# Patient Record
Sex: MALE | Race: WHITE | NOT HISPANIC OR LATINO | Employment: UNEMPLOYED | URBAN - METROPOLITAN AREA
[De-identification: names, ages, dates, MRNs, and addresses within clinical notes are randomized per-mention and may not be internally consistent; named-entity substitution may affect disease eponyms.]

---

## 2020-01-31 ENCOUNTER — HOSPITAL ENCOUNTER (EMERGENCY)
Facility: HOSPITAL | Age: 55
Discharge: HOME | End: 2020-01-31
Attending: EMERGENCY MEDICINE
Payer: MEDICAID

## 2020-01-31 VITALS
HEART RATE: 86 BPM | TEMPERATURE: 98 F | SYSTOLIC BLOOD PRESSURE: 130 MMHG | BODY MASS INDEX: 32.18 KG/M2 | HEIGHT: 67 IN | RESPIRATION RATE: 20 BRPM | DIASTOLIC BLOOD PRESSURE: 99 MMHG | WEIGHT: 205 LBS | OXYGEN SATURATION: 99 %

## 2020-01-31 DIAGNOSIS — S05.01XA ABRASION OF RIGHT CORNEA, INITIAL ENCOUNTER: Primary | ICD-10-CM

## 2020-01-31 PROCEDURE — 63600175 PHARM REV CODE 636 W HCPCS: Performed by: PHYSICIAN ASSISTANT

## 2020-01-31 PROCEDURE — 90715 TDAP VACCINE 7 YRS/> IM: CPT | Performed by: PHYSICIAN ASSISTANT

## 2020-01-31 PROCEDURE — 99284 EMERGENCY DEPT VISIT MOD MDM: CPT | Mod: 25

## 2020-01-31 PROCEDURE — 25000003 PHARM REV CODE 250: Performed by: PHYSICIAN ASSISTANT

## 2020-01-31 PROCEDURE — 90471 IMMUNIZATION ADMIN: CPT | Performed by: PHYSICIAN ASSISTANT

## 2020-01-31 RX ORDER — ERYTHROMYCIN 5 MG/G
OINTMENT OPHTHALMIC
Qty: 1 TUBE | Refills: 0 | Status: SHIPPED | OUTPATIENT
Start: 2020-01-31

## 2020-01-31 RX ORDER — PROPARACAINE HYDROCHLORIDE 5 MG/ML
1 SOLUTION/ DROPS OPHTHALMIC
Status: COMPLETED | OUTPATIENT
Start: 2020-01-31 | End: 2020-01-31

## 2020-01-31 RX ORDER — HYDROCODONE BITARTRATE AND ACETAMINOPHEN 5; 325 MG/1; MG/1
1 TABLET ORAL EVERY 4 HOURS PRN
Qty: 6 TABLET | Refills: 0 | Status: SHIPPED | OUTPATIENT
Start: 2020-01-31

## 2020-01-31 RX ADMIN — FLUORESCEIN SODIUM 1 EACH: 1 STRIP OPHTHALMIC at 06:01

## 2020-01-31 RX ADMIN — CLOSTRIDIUM TETANI TOXOID ANTIGEN (FORMALDEHYDE INACTIVATED), CORYNEBACTERIUM DIPHTHERIAE TOXOID ANTIGEN (FORMALDEHYDE INACTIVATED), BORDETELLA PERTUSSIS TOXOID ANTIGEN (GLUTARALDEHYDE INACTIVATED), BORDETELLA PERTUSSIS FILAMENTOUS HEMAGGLUTININ ANTIGEN (FORMALDEHYDE INACTIVATED), BORDETELLA PERTUSSIS PERTACTIN ANTIGEN, AND BORDETELLA PERTUSSIS FIMBRIAE 2/3 ANTIGEN 0.5 ML: 5; 2; 2.5; 5; 3; 5 INJECTION, SUSPENSION INTRAMUSCULAR at 06:01

## 2020-01-31 RX ADMIN — PROPARACAINE HYDROCHLORIDE 1 DROP: 5 SOLUTION/ DROPS OPHTHALMIC at 06:01
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
## 2020-02-01 NOTE — DISCHARGE INSTRUCTIONS
Please follow-up with optometrist or Ophthalmologist as soon as possible to ensure proper wound healing

## 2023-04-03 ENCOUNTER — HOSPITAL ENCOUNTER (EMERGENCY)
Facility: HOSPITAL | Age: 58
Discharge: HOME | End: 2023-04-03
Attending: EMERGENCY MEDICINE
Payer: MEDICAID

## 2023-04-03 VITALS
OXYGEN SATURATION: 99 % | TEMPERATURE: 99 F | HEART RATE: 95 BPM | WEIGHT: 191 LBS | RESPIRATION RATE: 18 BRPM | SYSTOLIC BLOOD PRESSURE: 130 MMHG | BODY MASS INDEX: 28.95 KG/M2 | DIASTOLIC BLOOD PRESSURE: 75 MMHG | HEIGHT: 68 IN

## 2023-04-03 DIAGNOSIS — R50.9 FEVER: ICD-10-CM

## 2023-04-03 DIAGNOSIS — J18.9 PNEUMONIA OF RIGHT LOWER LOBE DUE TO INFECTIOUS ORGANISM: Primary | ICD-10-CM

## 2023-04-03 DIAGNOSIS — R31.9 HEMATURIA, UNSPECIFIED TYPE: ICD-10-CM

## 2023-04-03 DIAGNOSIS — R09.89 RALES: ICD-10-CM

## 2023-04-03 DIAGNOSIS — E87.1 HYPONATREMIA: ICD-10-CM

## 2023-04-03 DIAGNOSIS — R73.9 HYPERGLYCEMIA: ICD-10-CM

## 2023-04-03 LAB
ALBUMIN SERPL BCP-MCNC: 2.9 G/DL (ref 3.5–5.2)
ALP SERPL-CCNC: 72 U/L (ref 55–135)
ALT SERPL W/O P-5'-P-CCNC: 31 U/L (ref 10–44)
ANION GAP SERPL CALC-SCNC: 6 MMOL/L (ref 8–16)
AST SERPL-CCNC: 35 U/L (ref 10–40)
BACTERIA #/AREA URNS HPF: ABNORMAL /HPF
BASOPHILS # BLD AUTO: 0.03 K/UL (ref 0–0.2)
BASOPHILS NFR BLD: 0.4 % (ref 0–1.9)
BILIRUB SERPL-MCNC: 1.3 MG/DL (ref 0.1–1)
BILIRUB UR QL STRIP: NEGATIVE
BUN SERPL-MCNC: 15 MG/DL (ref 6–20)
CALCIUM SERPL-MCNC: 8.8 MG/DL (ref 8.7–10.5)
CHLORIDE SERPL-SCNC: 95 MMOL/L (ref 95–110)
CLARITY UR: CLEAR
CO2 SERPL-SCNC: 28 MMOL/L (ref 23–29)
COLOR UR: YELLOW
CREAT SERPL-MCNC: 0.9 MG/DL (ref 0.5–1.4)
DIFFERENTIAL METHOD: ABNORMAL
EOSINOPHIL # BLD AUTO: 0 K/UL (ref 0–0.5)
EOSINOPHIL NFR BLD: 0 % (ref 0–8)
ERYTHROCYTE [DISTWIDTH] IN BLOOD BY AUTOMATED COUNT: 14 % (ref 11.5–14.5)
EST. GFR  (NO RACE VARIABLE): >60 ML/MIN/1.73 M^2
GLUCOSE SERPL-MCNC: 171 MG/DL (ref 70–110)
GLUCOSE UR QL STRIP: ABNORMAL
HCT VFR BLD AUTO: 38.8 % (ref 40–54)
HGB BLD-MCNC: 13.3 G/DL (ref 14–18)
HGB UR QL STRIP: ABNORMAL
HYALINE CASTS #/AREA URNS LPF: 0 /LPF
IMM GRANULOCYTES # BLD AUTO: 0.03 K/UL (ref 0–0.04)
IMM GRANULOCYTES NFR BLD AUTO: 0.4 % (ref 0–0.5)
KETONES UR QL STRIP: ABNORMAL
LEUKOCYTE ESTERASE UR QL STRIP: NEGATIVE
LIPASE SERPL-CCNC: 14 U/L (ref 4–60)
LYMPHOCYTES # BLD AUTO: 0.3 K/UL (ref 1–4.8)
LYMPHOCYTES NFR BLD: 4.2 % (ref 18–48)
MCH RBC QN AUTO: 29.7 PG (ref 27–31)
MCHC RBC AUTO-ENTMCNC: 34.3 G/DL (ref 32–36)
MCV RBC AUTO: 87 FL (ref 82–98)
MICROSCOPIC COMMENT: ABNORMAL
MONOCYTES # BLD AUTO: 0.6 K/UL (ref 0.3–1)
MONOCYTES NFR BLD: 8.2 % (ref 4–15)
NEUTROPHILS # BLD AUTO: 5.8 K/UL (ref 1.8–7.7)
NEUTROPHILS NFR BLD: 86.8 % (ref 38–73)
NITRITE UR QL STRIP: NEGATIVE
NRBC BLD-RTO: 0 /100 WBC
PH UR STRIP: 6 [PH] (ref 5–8)
PLATELET # BLD AUTO: 107 K/UL (ref 150–450)
PMV BLD AUTO: 11.7 FL (ref 9.2–12.9)
POTASSIUM SERPL-SCNC: 4.1 MMOL/L (ref 3.5–5.1)
PROT SERPL-MCNC: 7 G/DL (ref 6–8.4)
PROT UR QL STRIP: ABNORMAL
RBC # BLD AUTO: 4.48 M/UL (ref 4.6–6.2)
RBC #/AREA URNS HPF: 17 /HPF (ref 0–4)
SODIUM SERPL-SCNC: 129 MMOL/L (ref 136–145)
SP GR UR STRIP: 1.02 (ref 1–1.03)
URN SPEC COLLECT METH UR: ABNORMAL
UROBILINOGEN UR STRIP-ACNC: ABNORMAL EU/DL
WBC # BLD AUTO: 6.67 K/UL (ref 3.9–12.7)
WBC #/AREA URNS HPF: 1 /HPF (ref 0–5)

## 2023-04-03 PROCEDURE — 81000 URINALYSIS NONAUTO W/SCOPE: CPT | Performed by: PHYSICIAN ASSISTANT

## 2023-04-03 PROCEDURE — 85025 COMPLETE CBC W/AUTO DIFF WBC: CPT | Performed by: EMERGENCY MEDICINE

## 2023-04-03 PROCEDURE — 63600175 PHARM REV CODE 636 W HCPCS: Performed by: PHYSICIAN ASSISTANT

## 2023-04-03 PROCEDURE — 63600175 PHARM REV CODE 636 W HCPCS: Performed by: EMERGENCY MEDICINE

## 2023-04-03 PROCEDURE — 96375 TX/PRO/DX INJ NEW DRUG ADDON: CPT

## 2023-04-03 PROCEDURE — 25000003 PHARM REV CODE 250: Performed by: EMERGENCY MEDICINE

## 2023-04-03 PROCEDURE — 96374 THER/PROPH/DIAG INJ IV PUSH: CPT

## 2023-04-03 PROCEDURE — 80053 COMPREHEN METABOLIC PANEL: CPT | Performed by: EMERGENCY MEDICINE

## 2023-04-03 PROCEDURE — 25000003 PHARM REV CODE 250: Performed by: PHYSICIAN ASSISTANT

## 2023-04-03 PROCEDURE — 83690 ASSAY OF LIPASE: CPT | Performed by: EMERGENCY MEDICINE

## 2023-04-03 PROCEDURE — 96361 HYDRATE IV INFUSION ADD-ON: CPT

## 2023-04-03 PROCEDURE — 99285 EMERGENCY DEPT VISIT HI MDM: CPT | Mod: 25

## 2023-04-03 RX ORDER — KETOROLAC TROMETHAMINE 30 MG/ML
15 INJECTION, SOLUTION INTRAMUSCULAR; INTRAVENOUS
Status: COMPLETED | OUTPATIENT
Start: 2023-04-03 | End: 2023-04-03

## 2023-04-03 RX ORDER — LEVOFLOXACIN 750 MG/1
750 TABLET ORAL
Status: COMPLETED | OUTPATIENT
Start: 2023-04-03 | End: 2023-04-03

## 2023-04-03 RX ORDER — ONDANSETRON 2 MG/ML
4 INJECTION INTRAMUSCULAR; INTRAVENOUS
Status: COMPLETED | OUTPATIENT
Start: 2023-04-03 | End: 2023-04-03

## 2023-04-03 RX ORDER — LEVOFLOXACIN 750 MG/1
750 TABLET ORAL DAILY
Qty: 4 TABLET | Refills: 0 | Status: SHIPPED | OUTPATIENT
Start: 2023-04-04 | End: 2023-04-08

## 2023-04-03 RX ORDER — MAG HYDROX/ALUMINUM HYD/SIMETH 200-200-20
30 SUSPENSION, ORAL (FINAL DOSE FORM) ORAL
Status: DISCONTINUED | OUTPATIENT
Start: 2023-04-03 | End: 2023-04-03 | Stop reason: HOSPADM

## 2023-04-03 RX ORDER — FAMOTIDINE 20 MG/1
20 TABLET, FILM COATED ORAL
Status: COMPLETED | OUTPATIENT
Start: 2023-04-03 | End: 2023-04-03

## 2023-04-03 RX ORDER — ALUMINUM HYDROXIDE, MAGNESIUM HYDROXIDE, AND SIMETHICONE 2400; 240; 2400 MG/30ML; MG/30ML; MG/30ML
15 SUSPENSION ORAL EVERY 6 HOURS PRN
Refills: 0 | COMMUNITY
Start: 2023-04-03 | End: 2023-04-03 | Stop reason: SDUPTHER

## 2023-04-03 RX ORDER — LEVOFLOXACIN 750 MG/1
750 TABLET ORAL DAILY
Qty: 4 TABLET | Refills: 0 | Status: SHIPPED | OUTPATIENT
Start: 2023-04-04 | End: 2023-04-03 | Stop reason: SDUPTHER

## 2023-04-03 RX ORDER — FAMOTIDINE 20 MG/1
20 TABLET, FILM COATED ORAL 2 TIMES DAILY
Qty: 20 TABLET | Refills: 0 | Status: SHIPPED | OUTPATIENT
Start: 2023-04-03 | End: 2024-04-02

## 2023-04-03 RX ORDER — ONDANSETRON 4 MG/1
4 TABLET, ORALLY DISINTEGRATING ORAL EVERY 8 HOURS PRN
Qty: 12 TABLET | Refills: 0 | Status: SHIPPED | OUTPATIENT
Start: 2023-04-03

## 2023-04-03 RX ORDER — ONDANSETRON 4 MG/1
4 TABLET, ORALLY DISINTEGRATING ORAL EVERY 8 HOURS PRN
Qty: 12 TABLET | Refills: 0 | Status: SHIPPED | OUTPATIENT
Start: 2023-04-03 | End: 2023-04-03 | Stop reason: SDUPTHER

## 2023-04-03 RX ORDER — ALUMINUM HYDROXIDE, MAGNESIUM HYDROXIDE, AND SIMETHICONE 2400; 240; 2400 MG/30ML; MG/30ML; MG/30ML
15 SUSPENSION ORAL EVERY 6 HOURS PRN
Refills: 0 | COMMUNITY
Start: 2023-04-03 | End: 2024-04-02

## 2023-04-03 RX ORDER — FAMOTIDINE 20 MG/1
20 TABLET, FILM COATED ORAL 2 TIMES DAILY
Qty: 20 TABLET | Refills: 0 | Status: SHIPPED | OUTPATIENT
Start: 2023-04-03 | End: 2023-04-03 | Stop reason: SDUPTHER

## 2023-04-03 RX ADMIN — LEVOFLOXACIN 750 MG: 750 TABLET, FILM COATED ORAL at 01:04

## 2023-04-03 RX ADMIN — FAMOTIDINE 20 MG: 20 TABLET, FILM COATED ORAL at 01:04

## 2023-04-03 RX ADMIN — SODIUM CHLORIDE 1000 ML: 0.9 INJECTION, SOLUTION INTRAVENOUS at 11:04

## 2023-04-03 RX ADMIN — KETOROLAC TROMETHAMINE 15 MG: 30 INJECTION, SOLUTION INTRAMUSCULAR; INTRAVENOUS at 12:04

## 2023-04-03 RX ADMIN — ONDANSETRON HYDROCHLORIDE 4 MG: 2 SOLUTION INTRAMUSCULAR; INTRAVENOUS at 11:04

## 2023-04-03 NOTE — ED PROVIDER NOTES
Encounter Date: 4/3/2023       History     Chief Complaint   Patient presents with    Headache     Pt reports headache x 5 days. Pt also has fever, (he is visibly diaphoretic), nausea and abdominal pain. + vomiting as well. Pt reports fever at home was 103 but he took motrin pta. Pt has  appetite but trying to drink fluids. Pt appears in mild distress in triage.      HPI  This is a 57 y.o. male who has a past medical history of Chronic kidney disease, Diabetes mellitus, type 2, and Hypertension.     The patient presents to the Emergency Department with headache x5 days, associated with fever yesterday and today, 103 this morning.  Associated occasional congestion and cough, decreased appetite, nausea and a few episodes of vomiting.  Denies any diarrhea state that he has some upper abdominal pain as well.  No neck stiffness. States difficulty with urination but no pain. Urine is darker today.       Review of patient's allergies indicates:  No Known Allergies  Past Medical History:   Diagnosis Date    Chronic kidney disease     Diabetes mellitus, type 2     Hypertension      No past surgical history on file.  Family History   Problem Relation Age of Onset    Heart attack Mother     Hypertension Mother     Heart attack Father     Hypertension Father     Diabetes Sister     No Known Problems Brother     No Known Problems Maternal Aunt     No Known Problems Maternal Uncle     No Known Problems Paternal Aunt     No Known Problems Paternal Uncle     No Known Problems Maternal Grandmother     No Known Problems Maternal Grandfather     No Known Problems Paternal Grandmother     No Known Problems Paternal Grandfather     Amblyopia Neg Hx     Blindness Neg Hx     Cancer Neg Hx     Cataracts Neg Hx     Glaucoma Neg Hx     Macular degeneration Neg Hx     Retinal detachment Neg Hx     Strabismus Neg Hx     Stroke Neg Hx     Thyroid disease Neg Hx      Social History     Tobacco Use    Smoking status: Never    Smokeless  tobacco: Never   Substance Use Topics    Alcohol use: No    Drug use: No     Review of Systems   Constitutional:  Positive for activity change, appetite change, diaphoresis and fever.   HENT:  Positive for congestion.    Respiratory:  Positive for cough. Negative for shortness of breath.    Gastrointestinal:  Positive for abdominal pain, nausea and vomiting. Negative for diarrhea.   Neurological:  Positive for dizziness (Meniere's dz). Negative for weakness.   All other systems reviewed and are negative.    Physical Exam     Initial Vitals [04/03/23 1043]   BP Pulse Resp Temp SpO2   133/78 96 18 99 °F (37.2 °C) 96 %      MAP       --         Physical Exam    Nursing note and vitals reviewed.  Constitutional: He appears well-developed and well-nourished. He is diaphoretic (mild). No distress.   HENT:   Head: Normocephalic and atraumatic.   Right Ear: External ear normal.   Left Ear: External ear normal.   Mouth/Throat: Oropharynx is clear and moist. No oropharyngeal exudate.   TM WNL bilat  Nose WNL  Post pharynx WNL, unremarkable   Eyes: Conjunctivae are normal. Pupils are equal, round, and reactive to light.   Neck: Neck supple.   Normal range of motion.  Cardiovascular:  Normal rate, regular rhythm, normal heart sounds and intact distal pulses.           Pulmonary/Chest: No respiratory distress. He has no wheezes. He has rales (right lower lung field posteriorly).   Abdominal: Abdomen is soft. Bowel sounds are normal. He exhibits no distension. There is abdominal tenderness (epgastric). There is no rebound and no guarding.   Musculoskeletal:         General: No tenderness or edema. Normal range of motion.      Cervical back: Normal range of motion and neck supple.     Lymphadenopathy:     He has no cervical adenopathy.   Neurological: He is alert and oriented to person, place, and time. He has normal strength.   Skin: Skin is warm. Capillary refill takes less than 2 seconds. No rash noted. No erythema.    Psychiatric: He has a normal mood and affect. Thought content normal.       ED Course   Procedures  Labs Reviewed   URINALYSIS, REFLEX TO URINE CULTURE - Abnormal; Notable for the following components:       Result Value    Protein, UA 2+ (*)     Glucose, UA Trace (*)     Ketones, UA 1+ (*)     Occult Blood UA 2+ (*)     Urobilinogen, UA 2.0-3.0 (*)     All other components within normal limits    Narrative:     Specimen Source->Urine   URINALYSIS MICROSCOPIC - Abnormal; Notable for the following components:    RBC, UA 17 (*)     All other components within normal limits    Narrative:     Specimen Source->Urine   COMPREHENSIVE METABOLIC PANEL - Abnormal; Notable for the following components:    Sodium 129 (*)     Glucose 171 (*)     Albumin 2.9 (*)     Total Bilirubin 1.3 (*)     Anion Gap 6 (*)     All other components within normal limits    Narrative:     Collection has been rescheduled by [facility] at 04/03/2023 12:40 Reason:   Patient unavailable also in Ct   CBC W/ AUTO DIFFERENTIAL - Abnormal; Notable for the following components:    RBC 4.48 (*)     Hemoglobin 13.3 (*)     Hematocrit 38.8 (*)     Platelets 107 (*)     Lymph # 0.3 (*)     Gran % 86.8 (*)     Lymph % 4.2 (*)     All other components within normal limits    Narrative:     Collection has been rescheduled by [facility] at 04/03/2023 12:40 Reason:   Patient unavailable also in Ct   LIPASE    Narrative:     Collection has been rescheduled by [facility] at 04/03/2023 12:40 Reason:   Patient unavailable also in Ct          Imaging Results               CT Abdomen Pelvis  Without Contrast (Final result)  Result time 04/03/23 15:08:43      Final result by Kendrick Alfonso MD (04/03/23 15:08:43)                   Impression:      This report was flagged in Epic as abnormal.    1. Right perinephric inflammation without obstruction.  Finding may reflect sequela of recently passed calculus or infection, correlation with urinalysis is advised in this patient with  history of hematuria.  2. Multiple patchy foci of consolidation, much of which in a tree-in-bud type configuration, throughout the right lower lobe concerning for infection.  Follow-up is recommended to ensure resolution and to exclude malignancy.  3. Findings suggest hepatic steatosis, correlation with LFTs recommended.  4. Please see above for several additional findings.      Electronically signed by: Kendrick Alfonso MD  Date:    04/03/2023  Time:    15:08               Narrative:    EXAMINATION:  CT ABDOMEN PELVIS WITHOUT CONTRAST    CLINICAL HISTORY:  Hematuria, gross/macroscopic;    TECHNIQUE:  Low dose axial images, sagittal and coronal reformations were obtained from the lung bases to the pubic symphysis.  Oral contrast was not administered.    COMPARISON:  None    FINDINGS:  Images of the lower thorax are remarkable for patchy consolidation, much of which in a tree-in-bud type configuration, within the right lower lobe.    The liver is hypoattenuating, may reflect steatosis, correlation with LFTs recommended.  The spleen and adrenal glands are grossly unremarkable.  The gallbladder is unremarkable.  There is fatty atrophy of the pancreas without pancreatic ductal dilation.  The stomach is decompressed without wall thickening.  There are a few scattered prominent kassie hepatic lymph nodes.    The left kidney has a grossly unremarkable noncontrast appearance without hydronephrosis or nephrolithiasis.  The left ureter is unremarkable without calculi seen.  There is right perinephric fat stranding.  No right nephrolithiasis.  No right hydronephrosis.  The right ureter is unremarkable without calculi seen.  The urinary bladder is distended without wall thickening.  The prostate is not enlarged.    There are several scattered colonic diverticula without inflammation.  The terminal ileum and appendix are unremarkable.  The small bowel is grossly unremarkable.  There are several scattered shotty periaortic,  pericaval, and mesenteric lymph nodes.  There is atherosclerotic calcification of the aorta.  No focal organized pelvic fluid collection.  There is a fat containing left inguinal hernia.    There are degenerative changes of the spine.  No significant inguinal lymphadenopathy.                                       X-Ray Chest PA And Lateral (Final result)  Result time 04/03/23 12:52:20      Final result by Osmani Zuñiga III, MD (04/03/23 12:52:20)                   Impression:      Right lower lobe pneumonia.      Electronically signed by: Osmani Zuñiga MD  Date:    04/03/2023  Time:    12:52               Narrative:    EXAMINATION:  XR CHEST PA AND LATERAL    CLINICAL HISTORY:  Fever, unspecified    FINDINGS:  There is a right lower lobe posterior basal segment infiltrate concerning for pneumonia.  This is new compared to 07/02/2012.  Left lung is clear the heart size is normal.  The bones showed DJD.                                    X-Rays:   Independently Interpreted Readings:   Chest X-Ray: There is an infiltrate in the RLL. Pneumonia   Medications   sodium chloride 0.9% bolus 1,000 mL 1,000 mL (0 mLs Intravenous Stopped 4/3/23 1253)   ondansetron injection 4 mg (4 mg Intravenous Given 4/3/23 1156)   ketorolac injection 15 mg (15 mg Intravenous Given 4/3/23 1222)   levoFLOXacin tablet 750 mg (750 mg Oral Given 4/3/23 1354)   famotidine tablet 20 mg (20 mg Oral Given 4/3/23 1355)     Medical Decision Making:   Initial Assessment:   This is an emergent evaluation of a 57 y.o.male patient with presentation of fever, headache x5 days, is complaints such as mild congestion, intermittent cough, upper abdominal pain in the epigastric region with nausea, worsened by p.o. intake.     Initial differentials include but are not limited to:  COVID, flu, other viral syndrome, community-acquired pneumonia, gastritis, ulcer, pancreatitis, cholelithiasis or cholecystitis, prostatitis, UTI.     Plan:  Agree with tele-triage  provider. Will add on chest x-ray and toradol 15mg IV.     Clinical Tests:   Lab Tests: Ordered and Reviewed  Radiological Study: Ordered and Reviewed                 Patient has evidence of pneumonia on x-ray.  Given Levaquin in the ED.     Patient's upper abdominal pain improved after Pepcid and Maalox, likely gastritis, possible ulcer.  Will need further follow-up with PCP and possible referral to GI.    CT abd/pelvis shows right perinephric inflammatory change, possible recent stone but no evidence currently.  As well there is no evidence of UTI, so doubt pyelonephritis. Discussed findings with regard to hematuria and possible differential diagnoses including cancer.  Patient may need further evaluation by Urology and repeat urinalysis.    Patient and spouse at the bedside expressed understanding to the findings, discussed return precautions and ability to return to the ED if they feel necessary.       Clinical Impression:   Final diagnoses:  [R09.89] Rales  [R50.9] Fever  [J18.9] Pneumonia of right lower lobe due to infectious organism (Primary)  [R31.9] Hematuria, unspecified type  [E87.1] Hyponatremia  [R73.9] Hyperglycemia        ED Disposition Condition    Discharge Stable          ED Prescriptions       Medication Sig Dispense Start Date End Date Auth. Provider    famotidine (PEPCID) 20 MG tablet  (Status: Discontinued) Take 1 tablet (20 mg total) by mouth 2 (two) times daily. 20 tablet 4/3/2023 4/3/2023 Te Sotomayor MD    levoFLOXacin (LEVAQUIN) 750 MG tablet  (Status: Discontinued) Take 1 tablet (750 mg total) by mouth once daily. for 4 days 4 tablet 4/4/2023 4/3/2023 Te Sotomayor MD    aluminum & magnesium hydroxide-simethicone (MAALOX MAXIMUM STRENGTH) 400-400-40 mg/5 mL suspension  (Status: Discontinued) Take 15 mLs by mouth every 6 (six) hours as needed for Indigestion. -- 4/3/2023 4/3/2023 Te Sotomayor MD    ondansetron (ZOFRAN-ODT) 4 MG TbDL  (Status: Discontinued) Take 1 tablet (4 mg total)  by mouth every 8 (eight) hours as needed (nausea/vomiting). 12 tablet 4/3/2023 4/3/2023 Te Sotomayor MD    aluminum & magnesium hydroxide-simethicone (MAALOX MAXIMUM STRENGTH) 400-400-40 mg/5 mL suspension Take 15 mLs by mouth every 6 (six) hours as needed for Indigestion. -- 4/3/2023 4/2/2024 Te Sotomayor MD    famotidine (PEPCID) 20 MG tablet Take 1 tablet (20 mg total) by mouth 2 (two) times daily. 20 tablet 4/3/2023 4/2/2024 Te Sotomayor MD    levoFLOXacin (LEVAQUIN) 750 MG tablet Take 1 tablet (750 mg total) by mouth once daily. for 4 days 4 tablet 4/4/2023 4/8/2023 Te Sotomayor MD    ondansetron (ZOFRAN-ODT) 4 MG TbDL Take 1 tablet (4 mg total) by mouth every 8 (eight) hours as needed (nausea/vomiting). 12 tablet 4/3/2023 -- Te Sotomayor MD          Follow-up Information       Follow up With Specialties Details Why Contact Info    Ramsey Tran MD Family Medicine, Sports Medicine Schedule an appointment as soon as possible for a visit   [address]  [place] [address]  [phone]               Te Sotomayor MD  04/04/23 4696

## 2023-04-03 NOTE — FIRST PROVIDER EVALUATION
Emergency Department TeleTriage Encounter Note      CHIEF COMPLAINT    Chief Complaint   Patient presents with    Headache     Pt reports headache x 5 days. Pt also has fever, (he is visibly diaphoretic), nausea and abdominal pain. + vomiting as well. Pt reports fever at home was 103 but he took motrin pta. Pt has  appetite but trying to drink fluids. Pt appears in mild distress in triage.        VITAL SIGNS   Initial Vitals [23 1043]   BP Pulse Resp Temp SpO2   133/78 96 18 99 °F (37.2 °C) 96 %      MAP       --            ALLERGIES    Review of patient's allergies indicates:  No Known Allergies    PROVIDER TRIAGE NOTE  Patient presents with fever, headache, central lower abdominal pain and nausea/vomiting. No recent travel or known exposure to illness.       ORDERS  Labs Reviewed - No data to display    ED Orders (720h ago, onward)      None              Virtual Visit Note: The provider triage portion of this emergency department evaluation and documentation was performed via [employer], a HIPAA-compliant telemedicine application, in concert with a tele-presenter in the room. A face to face patient evaluation with one of my colleagues will occur once the patient is placed in an emergency department room.      DISCLAIMER: This note was prepared with [employer] voice recognition transcription software. Garbled syntax, mangled pronouns, and other bizarre constructions may be attributed to that software system.

## 2023-04-03 NOTE — DISCHARGE INSTRUCTIONS
Thank you for coming in to see us at [facility]! It was nice to meet you, and I hope you feel better soon. Please feel free to return to the ER at any time should your symptoms get worse, or if you have different emergent concerns.    Our goal in the emergency department is to always give you outstanding care and exceptional service. You may receive a survey by mail or e-mail in the next week regarding your experience in our ED. We would greatly appreciate your completing and returning the survey. Your feedback provides us with a way to recognize our staff who give very good care and it helps us learn how to improve when your experience was below our aspiration of excellence.       Sincerely,    Te Sotomayor MD  Medical Director  Emergency Department  [facility]

## 2023-04-03 NOTE — ED TRIAGE NOTES
Pt is reporting a headache, nv, fever, fatigue, and gen abd pain x5 days. Pt treated his nausea and fever at home pta. Denies diarrhea and sob   light meconium

## 2023-05-08 ENCOUNTER — HOSPITAL ENCOUNTER (OUTPATIENT)
Dept: RADIOLOGY | Facility: HOSPITAL | Age: 58
Discharge: HOME | End: 2023-05-08
Attending: FAMILY MEDICINE
Payer: MEDICAID

## 2023-05-08 DIAGNOSIS — J18.9 PNEUMONIA: ICD-10-CM

## 2023-05-08 DIAGNOSIS — J18.9 PNEUMONIA: Primary | ICD-10-CM

## 2023-05-08 PROCEDURE — 71046 X-RAY EXAM CHEST 2 VIEWS: CPT | Mod: TC

## 2023-05-08 PROCEDURE — 71046 XR CHEST PA AND LATERAL: ICD-10-PCS | Mod: 26,,, | Performed by: RADIOLOGY

## 2023-05-08 PROCEDURE — 71046 X-RAY EXAM CHEST 2 VIEWS: CPT | Mod: 26,,, | Performed by: RADIOLOGY

## 2024-02-03 ENCOUNTER — HOSPITAL ENCOUNTER (EMERGENCY)
Facility: HOSPITAL | Age: 59
Discharge: HOME | End: 2024-02-03
Attending: EMERGENCY MEDICINE
Payer: MEDICAID

## 2024-02-03 VITALS
WEIGHT: 200 LBS | TEMPERATURE: 98 F | DIASTOLIC BLOOD PRESSURE: 80 MMHG | OXYGEN SATURATION: 97 % | RESPIRATION RATE: 13 BRPM | BODY MASS INDEX: 30.41 KG/M2 | HEART RATE: 71 BPM | SYSTOLIC BLOOD PRESSURE: 143 MMHG

## 2024-02-03 DIAGNOSIS — K92.2 GASTROINTESTINAL HEMORRHAGE, UNSPECIFIED GASTROINTESTINAL HEMORRHAGE TYPE: Primary | ICD-10-CM

## 2024-02-03 LAB
ALBUMIN SERPL BCP-MCNC: 3.9 G/DL (ref 3.5–5.2)
ALP SERPL-CCNC: 50 U/L (ref 55–135)
ALT SERPL W/O P-5'-P-CCNC: 28 U/L (ref 10–44)
ANION GAP SERPL CALC-SCNC: 10 MMOL/L (ref 8–16)
AST SERPL-CCNC: 20 U/L (ref 10–40)
BASOPHILS # BLD AUTO: 0.05 K/UL (ref 0–0.2)
BASOPHILS NFR BLD: 1 % (ref 0–1.9)
BILIRUB SERPL-MCNC: 1 MG/DL (ref 0.1–1)
BUN SERPL-MCNC: 17 MG/DL (ref 6–20)
CALCIUM SERPL-MCNC: 9.3 MG/DL (ref 8.7–10.5)
CHLORIDE SERPL-SCNC: 103 MMOL/L (ref 95–110)
CO2 SERPL-SCNC: 26 MMOL/L (ref 23–29)
CREAT SERPL-MCNC: 0.9 MG/DL (ref 0.5–1.4)
DIFFERENTIAL METHOD BLD: ABNORMAL
EOSINOPHIL # BLD AUTO: 0.1 K/UL (ref 0–0.5)
EOSINOPHIL NFR BLD: 2.3 % (ref 0–8)
ERYTHROCYTE [DISTWIDTH] IN BLOOD BY AUTOMATED COUNT: 13.5 % (ref 11.5–14.5)
EST. GFR  (NO RACE VARIABLE): >60 ML/MIN/1.73 M^2
GLUCOSE SERPL-MCNC: 150 MG/DL (ref 70–110)
HCT VFR BLD AUTO: 42.1 % (ref 40–54)
HGB BLD-MCNC: 14.3 G/DL (ref 14–18)
IMM GRANULOCYTES # BLD AUTO: 0.01 K/UL (ref 0–0.04)
IMM GRANULOCYTES NFR BLD AUTO: 0.2 % (ref 0–0.5)
LYMPHOCYTES # BLD AUTO: 1 K/UL (ref 1–4.8)
LYMPHOCYTES NFR BLD: 21.3 % (ref 18–48)
MCH RBC QN AUTO: 29.7 PG (ref 27–31)
MCHC RBC AUTO-ENTMCNC: 34 G/DL (ref 32–36)
MCV RBC AUTO: 88 FL (ref 82–98)
MONOCYTES # BLD AUTO: 0.5 K/UL (ref 0.3–1)
MONOCYTES NFR BLD: 9.6 % (ref 4–15)
NEUTROPHILS # BLD AUTO: 3.1 K/UL (ref 1.8–7.7)
NEUTROPHILS NFR BLD: 65.6 % (ref 38–73)
NRBC BLD-RTO: 0 /100 WBC
OB PNL STL: POSITIVE
PLATELET # BLD AUTO: 118 K/UL (ref 150–450)
PMV BLD AUTO: 12.5 FL (ref 9.2–12.9)
POTASSIUM SERPL-SCNC: 4.1 MMOL/L (ref 3.5–5.1)
PROT SERPL-MCNC: 6.8 G/DL (ref 6–8.4)
RBC # BLD AUTO: 4.81 M/UL (ref 4.6–6.2)
SODIUM SERPL-SCNC: 139 MMOL/L (ref 136–145)
WBC # BLD AUTO: 4.79 K/UL (ref 3.9–12.7)

## 2024-02-03 PROCEDURE — 85025 COMPLETE CBC W/AUTO DIFF WBC: CPT | Performed by: EMERGENCY MEDICINE

## 2024-02-03 PROCEDURE — 82272 OCCULT BLD FECES 1-3 TESTS: CPT | Performed by: EMERGENCY MEDICINE

## 2024-02-03 PROCEDURE — 99283 EMERGENCY DEPT VISIT LOW MDM: CPT

## 2024-02-03 PROCEDURE — 80053 COMPREHEN METABOLIC PANEL: CPT | Performed by: EMERGENCY MEDICINE

## 2024-02-03 NOTE — ED PROVIDER NOTES
Emergency Department Provider Note    Jerson Isabel   58 y.o. male   [id number]      2/3/2024       History     This history was obtained from the patient without limitations.      He is a 58-year-old with the below past medical history who presents by personal transportation.  He complains of daily gastrointestinal bleeding with bowel movements for approximately 15 days.  He notices blood in the commode and on the toilet paper with wiping.  He denies anal discomfort.  He has intermittent left-sided abdominal cramping.  He has intermittent lightheadedness and near-syncope, primarily with straining to have bowel movements.  He denies fever, chills, nausea, vomiting, back pain, dysuria, and hematuria.         Past Medical History:   Diagnosis Date    Chronic kidney disease     Diabetes mellitus, type 2     Hypertension       No past surgical history on file.   Family History   Problem Relation Age of Onset    Heart attack Mother     Hypertension Mother     Heart attack Father     Hypertension Father     Diabetes Sister     No Known Problems Brother     No Known Problems Maternal Aunt     No Known Problems Maternal Uncle     No Known Problems Paternal Aunt     No Known Problems Paternal Uncle     No Known Problems Maternal Grandmother     No Known Problems Maternal Grandfather     No Known Problems Paternal Grandmother     No Known Problems Paternal Grandfather     Amblyopia Neg Hx     Blindness Neg Hx     Cancer Neg Hx     Cataracts Neg Hx     Glaucoma Neg Hx     Macular degeneration Neg Hx     Retinal detachment Neg Hx     Strabismus Neg Hx     Stroke Neg Hx     Thyroid disease Neg Hx       Social History     Socioeconomic History    Marital status:    Occupational History     Employer: [employer]   Tobacco Use    Smoking status: Never    Smokeless tobacco: Never   Substance and Sexual Activity    Alcohol use: No    Drug use: No    Sexual activity: Yes     Partners: Female      Review of patient's allergies  indicates:  No Known Allergies        Physical Examination     Initial Vitals [02/03/24 1107]   BP Pulse Resp Temp SpO2   133/84 75 16 98.1 °F (36.7 °C) 99 %      MAP       --           Physical Exam    Nursing note and vitals reviewed.  Constitutional: He is not diaphoretic. No distress.   Eyes: Conjunctivae are normal. No scleral icterus.   Cardiovascular:  Normal rate, regular rhythm and normal heart sounds.     Exam reveals no gallop and no friction rub.       No murmur heard.  Pulmonary/Chest: No respiratory distress. He has no wheezes. He has no rhonchi.   Abdominal: Abdomen is soft. He exhibits no distension. There is abdominal tenderness in the left upper quadrant and left lower quadrant.   Very mild left-sided abdominal tenderness without guarding palpable masses, or rebound tenderness.   Genitourinary:    Prostate normal.   Rectum:      No rectal mass, anal fissure, tenderness, external hemorrhoid, internal hemorrhoid or abnormal anal tone.       Neurological: He is alert and oriented to person, place, and time. GCS score is 15. GCS eye subscore is 4. GCS verbal subscore is 5. GCS motor subscore is 6.   Skin: Skin is warm and dry. No pallor.            Labs     Labs Reviewed   CBC W/ AUTO DIFFERENTIAL - Abnormal; Notable for the following components:       Result Value    Platelets 118 (*)     All other components within normal limits   COMPREHENSIVE METABOLIC PANEL - Abnormal; Notable for the following components:    Glucose 150 (*)     Alkaline Phosphatase 50 (*)     All other components within normal limits   OCCULT BLOOD X 1, STOOL - Abnormal; Notable for the following components:    Occult Blood Positive (*)     All other components within normal limits        Imaging     Imaging Results    None           ED Course     The patient received the following medications:  Medications - No data to display    Procedures    ED Course as of 02/03/24 1250   Sat Feb 03, 2024   1245 Occult Blood(!): Positive [LP]    1245 WBC: 4.79 [LP]   1245 Hemoglobin: 14.3 [LP]   1245 Hematocrit: 42.1 [LP]   1245 Platelet Count(!): 118  Chronic, stable thrombocytopenia. [LP]   1245 Comprehensive metabolic panel(!)  Glucose 150. Otherwise unremarkable. [LP]      ED Course User Index  [LP] Maynor Moe III, MD        Medical Decision Making                 Medical Decision Making  Patient well-appearing with normal vital signs.  Mild left-sided abdominal tenderness.  Normal digital rectal exam.  Occult blood present.  Normal WBC count, hemoglobin, hematocrit.  Stable chronic thrombocytopenia.  No concerning electrolyte anomalies or renal insufficiency.    No indication for admission or emergent specialist consultation.  Referral to Gastroenterology submitted.  Interval follow-up with PCP advised.    Problems Addressed:  Gastrointestinal hemorrhage, unspecified gastrointestinal hemorrhage type: acute illness or injury    Amount and/or Complexity of Data Reviewed  Labs: ordered. Decision-making details documented in ED Course.         Diagnoses       ICD-10-CM ICD-9-CM   1. Gastrointestinal hemorrhage, unspecified gastrointestinal hemorrhage type  K92.2 578.9         Dispostion      ED Disposition Condition    Discharge Stable            ED Prescriptions    None         Follow-up Information       Follow up With Specialties Details Why Contact Info    Ramsey Tran MD Family Medicine, Sports Medicine  Schedule a close ER follow-up visit. [address]  [place] [address]  [phone]      ER   Return to the ER worsened bleeding, severe/constant weakness, chest pain, breathing difficulty, or any other concerns needing immediate attention.               Maynor Moe III, MD  02/03/24 [id number]

## 2024-02-03 NOTE — DISCHARGE INSTRUCTIONS
We know that you have many choices and are honored that you chose us. We hope that we met or exceeded your expectations and goals for this visit and will keep the [facility] family in mind for your future needs and those of your family and friends.     - Dr. Moe

## 2024-12-18 DIAGNOSIS — M48.9 CERVICAL SPINE DISEASE: Primary | ICD-10-CM

## 2024-12-19 ENCOUNTER — OFFICE VISIT (OUTPATIENT)
Dept: NEUROSURGERY | Facility: CLINIC | Age: 59
End: 2024-12-19
Payer: MEDICAID

## 2024-12-19 VITALS
SYSTOLIC BLOOD PRESSURE: 150 MMHG | DIASTOLIC BLOOD PRESSURE: 80 MMHG | WEIGHT: 202.69 LBS | HEART RATE: 80 BPM | BODY MASS INDEX: 30.82 KG/M2

## 2024-12-19 DIAGNOSIS — G95.9 CERVICAL MYELOPATHY WITH CERVICAL RADICULOPATHY: ICD-10-CM

## 2024-12-19 DIAGNOSIS — M54.12 CERVICAL MYELOPATHY WITH CERVICAL RADICULOPATHY: ICD-10-CM

## 2024-12-19 DIAGNOSIS — M48.02 SPINAL STENOSIS, CERVICAL REGION: Primary | ICD-10-CM

## 2024-12-19 PROCEDURE — 99212 OFFICE O/P EST SF 10 MIN: CPT | Mod: PBBFAC | Performed by: PHYSICIAN ASSISTANT

## 2024-12-19 PROCEDURE — 99999 PR PBB SHADOW E&M-EST. PATIENT-LVL II: CPT | Mod: PBBFAC,,, | Performed by: PHYSICIAN ASSISTANT

## 2024-12-19 PROCEDURE — 99205 OFFICE O/P NEW HI 60 MIN: CPT | Mod: S$PBB,,, | Performed by: PHYSICIAN ASSISTANT

## 2024-12-19 PROCEDURE — 3079F DIAST BP 80-89 MM HG: CPT | Mod: CPTII,,, | Performed by: PHYSICIAN ASSISTANT

## 2024-12-19 PROCEDURE — 4010F ACE/ARB THERAPY RXD/TAKEN: CPT | Mod: CPTII,,, | Performed by: PHYSICIAN ASSISTANT

## 2024-12-19 PROCEDURE — 3008F BODY MASS INDEX DOCD: CPT | Mod: CPTII,,, | Performed by: PHYSICIAN ASSISTANT

## 2024-12-19 PROCEDURE — 3077F SYST BP >= 140 MM HG: CPT | Mod: CPTII,,, | Performed by: PHYSICIAN ASSISTANT

## 2024-12-30 NOTE — PROGRESS NOTES
Neurosurgery  History & Physical    SUBJECTIVE:     Chief Complaint: neck pain, arm pain     History of Present Illness:  Jerson Isabel is a 59 y.o. male who presents as a referral from Dr. Luis Enrique Benjamin for second opinion for cervical fusion. Patient reports several year history of neck pain and right arm pain. Pain is in the right arm and radiates into the last two digits of the hands, recently he developed left arm pain as well. He is a  and notes weakness in his hand  over the past several months. In addition he notes some issues with balance. He denies dropping objects or bowel/bladder issues. He was seen by Dr. Brumfield with [facility] and C3-C7 ACDF was recommended. Patient chose to forego and is here for a second opinion. He is hesitant to undergo spine surgery as he has a family member that had worsening pain and issues post op. He takes aspirin 81 mg daily for preventative reasons.       Review of patient's allergies indicates:  No Known Allergies    Current Outpatient Medications   Medication Sig Dispense Refill    aspirin 81 MG Chew Take 81 mg by mouth once daily.      atorvastatin (LIPITOR) 10 MG tablet Take 1 tablet (10 mg total) by mouth once daily. 90 tablet 3    erythromycin (ROMYCIN) ophthalmic ointment Place a 1/2 inch ribbon of ointment into the lower eyelid TID for 7 days 1 Tube 0    glipiZIDE (GLUCOTROL) 10 MG tablet Take 10 mg by mouth 2 (two) times daily with meals.      GLUCOSAM/MSM/CHONDR/VIT C/HYAL (GLUCOSAMINE-CHONDROITIN-MSM ORAL) Take 1 tablet by mouth 2 (two) times daily.      HYDROcodone-acetaminophen (NORCO) 5-325 mg per tablet Take 1 tablet by mouth every 4 (four) hours as needed for Pain. 6 tablet 0    lisinopril (PRINIVIL,ZESTRIL) 20 MG tablet Take 20 mg by mouth once daily.      metformin (GLUCOPHAGE) 1000 MG tablet Take 1 tablet (1,000 mg total) by mouth 2 (two) times daily with meals. 180 tablet 3    ondansetron (ZOFRAN-ODT) 4 MG TbDL Take 1 tablet (4 mg total) by mouth  every 8 (eight) hours as needed (nausea/vomiting). 12 tablet 0    semaglutide (OZEMPIC) 0.25 mg or 0.5 mg (2 mg/3 mL) pen injector Inject 0.5 mg into the skin every 7 days. 6 mL 1    aluminum & magnesium hydroxide-simethicone (MAALOX MAXIMUM STRENGTH) 400-400-40 mg/5 mL suspension Take 15 mLs by mouth every 6 (six) hours as needed for Indigestion.  0    famotidine (PEPCID) 20 MG tablet Take 1 tablet (20 mg total) by mouth 2 (two) times daily. 20 tablet 0     No current facility-administered medications for this visit.       Past Medical History:   Diagnosis Date    Chronic kidney disease     Diabetes mellitus, type 2     Hypertension      No past surgical history on file.  Family History       Problem Relation (Age of Onset)    Diabetes Sister    Heart attack Mother, Father    Hypertension Mother, Father    No Known Problems Brother, Maternal Aunt, Maternal Uncle, Paternal Aunt, Paternal Uncle, Maternal Grandmother, Maternal Grandfather, Paternal Grandmother, Paternal Grandfather          Social History     Socioeconomic History    Marital status:    Occupational History     Employer: [employer]   Tobacco Use    Smoking status: Never    Smokeless tobacco: Never   Substance and Sexual Activity    Alcohol use: No    Drug use: No    Sexual activity: Yes     Partners: Female       Review of Systems    OBJECTIVE:     Vital Signs  Pulse: 80  BP: (!) 150/80  Pain Score: 10-Worst pain ever  Weight: 92 kg (202 lb 11.4 oz)  Body mass index is 30.82 kg/m².      Neurosurgery Physical Exam  General: well developed, well nourished, no distress.   Head: normocephalic, atraumatic  Neurologic: Alert and oriented. Thought content appropriate.  GCS: Motor: 6/Verbal: 5/Eyes: 4 GCS Total: 15  Mental Status: Awake, Alert, Oriented x 4  Language: No aphasia  Speech: No dysarthria  Cranial nerves: face symmetric, tongue midline, CN II-XII grossly intact.   Eyes: pupils equal, round, reactive to light with accommodation, EOMI.    Pulmonary: normal respirations, no signs of respiratory distress  Abdomen: soft, non-distended, not tender to palpation  Skin: Skin is warm, dry and intact.  Sensory: intact to light touch throughout    Motor Strength:Moves all extremities spontaneously with good tone.  No abnormal movements seen.     Strength  Deltoids Triceps Biceps Wrist Extension Wrist Flexion Hand    Upper: R 5/5 5/5 5/5 5/5 5/5 4/5    L 5/5 5/5 5/5 5/5 5/5 4/5     Iliopsoas Quadriceps Knee  Flexion Tibialis  anterior Gastro- cnemius EHL   Lower: R 5/5 5/5 5/5 5/5 5/5 5/5    L 5/5 5/5 5/5 5/5 5/5 5/5     Andrade's: Negative.      Diagnostic Results:  MRI C spine from 6/10/24 which I have personally reviewed shows straightening of the normal cervical lordosis. There is significant central stenosis from C3-C7, worst at C5-6. There is neural foraminal narrowing throughout, worst on the right. There appears to thickening of the longitudinal ligament.     ASSESSMENT/PLAN:     Jerson Isabel is a 59 y.o. male with symptoms of cervical myelopathy along with central canal stenosis on MRI. He has had worsening hand weakness along with worsening neck pain over the past several months. I do believe he would benefit from surgical decompression given the significant central canal stenosis along with his symptoms. I would like to get a CT of the cervical spine to rule out OPLL. I would like for him to see Dr. Long to discuss surgical options following the CT. He knows he can call the clinic in the meantime with any questions or concerns.

## 2025-01-08 ENCOUNTER — TELEPHONE (OUTPATIENT)
Dept: NEUROSURGERY | Facility: CLINIC | Age: 60
End: 2025-01-08
Payer: MEDICAID

## 2025-01-08 NOTE — TELEPHONE ENCOUNTER
"----- Message from Med Assistant Quiana sent at 1/7/2025  4:55 PM CST -----  Regarding: FW: pt adivce  Contact: [phone]    ----- Message -----  From: Genoveva Escobedo  Sent: 1/7/2025   3:51 PM CST  To: Ethan SILVEIRA Staff  Subject: pt adivce                                        .Name Of Caller: Self     Contact Preference?:[phone]     What is the nature of the call?: in reference to a sooner appt due to pain, pls call      Additional Notes:  "Thank you for all that you do for our patients"  "

## 2025-01-08 NOTE — TELEPHONE ENCOUNTER
----- Message from Fiordaliza Dean sent at 1/3/2025  9:33 AM CST -----  Regarding: Sooner Appt Access  Contact: [phone]  Sooner Appt Request    Caller requesting a sooner appt. Caller declined 1st available appt and wait list.  Request message be sent to doctor.     Name of Caller: Patient    When is 1st available appt: 1/30/25    Symptoms: Severe pain     Call back #: [phone]

## 2025-01-27 ENCOUNTER — TELEPHONE (OUTPATIENT)
Dept: NEUROSURGERY | Facility: CLINIC | Age: 60
End: 2025-01-27
Payer: MEDICAID

## 2025-01-27 NOTE — TELEPHONE ENCOUNTER
----- Message from Med Assistant Araya sent at 1/23/2025 11:59 AM CST -----  Regarding: FW: Appt r/s / advise    ----- Message -----  From: Melba Woodward  Sent: 1/23/2025  11:16 AM CST  To: Ethan SILVEIRA Staff  Subject: Appt r/s / advise                                MARIA ELENA AVITIA calling regarding Patient Advice (message) for #pt is calling to see why his appt was cancelled and needs to r/s with soonest appt pls advise

## 2025-01-30 ENCOUNTER — OFFICE VISIT (OUTPATIENT)
Dept: NEUROSURGERY | Facility: CLINIC | Age: 60
End: 2025-01-30
Payer: MEDICAID

## 2025-01-30 ENCOUNTER — HOSPITAL ENCOUNTER (OUTPATIENT)
Dept: RADIOLOGY | Facility: HOSPITAL | Age: 60
Discharge: HOME | End: 2025-01-30
Attending: PHYSICIAN ASSISTANT
Payer: MEDICAID

## 2025-01-30 DIAGNOSIS — M48.02 SPINAL STENOSIS, CERVICAL REGION: ICD-10-CM

## 2025-01-30 DIAGNOSIS — M48.02 SPINAL STENOSIS, CERVICAL REGION: Primary | ICD-10-CM

## 2025-01-30 PROCEDURE — 1159F MED LIST DOCD IN RCRD: CPT | Mod: CPTII,,, | Performed by: STUDENT IN AN ORGANIZED HEALTH CARE EDUCATION/TRAINING PROGRAM

## 2025-01-30 PROCEDURE — 99214 OFFICE O/P EST MOD 30 MIN: CPT | Mod: S$PBB,,, | Performed by: STUDENT IN AN ORGANIZED HEALTH CARE EDUCATION/TRAINING PROGRAM

## 2025-01-30 PROCEDURE — 99212 OFFICE O/P EST SF 10 MIN: CPT | Mod: PBBFAC,25 | Performed by: STUDENT IN AN ORGANIZED HEALTH CARE EDUCATION/TRAINING PROGRAM

## 2025-01-30 PROCEDURE — 99999 PR PBB SHADOW E&M-EST. PATIENT-LVL II: CPT | Mod: PBBFAC,,, | Performed by: STUDENT IN AN ORGANIZED HEALTH CARE EDUCATION/TRAINING PROGRAM

## 2025-01-30 PROCEDURE — 72125 CT NECK SPINE W/O DYE: CPT | Mod: 26,,, | Performed by: RADIOLOGY

## 2025-01-30 PROCEDURE — 72125 CT NECK SPINE W/O DYE: CPT | Mod: TC

## 2025-01-30 PROCEDURE — 4010F ACE/ARB THERAPY RXD/TAKEN: CPT | Mod: CPTII,,, | Performed by: STUDENT IN AN ORGANIZED HEALTH CARE EDUCATION/TRAINING PROGRAM

## 2025-01-30 NOTE — PROGRESS NOTES
Neurosurgery  Established Patient    SUBJECTIVE:     History of Present Illness:  Jerson Isabel is a 59 y.o. male who presents as a referral from Dr. Luis Enrique Benjamin for second opinion for cervical fusion. Patient reports several year history of neck pain and right arm pain. Pain is in the right arm and radiates into the last two digits of the hands, recently he developed left arm pain as well. He is a  and notes weakness in his hand  over the past several months. In addition he notes some issues with balance. He denies dropping objects or bowel/bladder issues. He was seen by Dr. Brumfield with [facility] [place] and C3-C7 ACDF was recommended. Patient chose to forego and is here for a second opinion. He is hesitant to undergo spine surgery as he has a family member that had worsening pain and issues post op. He takes aspirin 81 mg daily for preventative reasons.     Interval fu 1/30/25:  Pt presents in fu after updated CT c spine.  He continues to endorse neck pain and BUE radicular pain right greater than left into his first 2 digits.  He also has numbness in this location.  He does feel that his hand strength has worsened.  He feels mildly unsteady when he walks.  He is a nonsmoker.    Review of patient's allergies indicates:  No Known Allergies    Current Outpatient Medications   Medication Sig Dispense Refill    aspirin 81 MG Chew Take 81 mg by mouth once daily.      atorvastatin (LIPITOR) 10 MG tablet Take 1 tablet (10 mg total) by mouth once daily. 90 tablet 3    erythromycin (ROMYCIN) ophthalmic ointment Place a 1/2 inch ribbon of ointment into the lower eyelid TID for 7 days 1 Tube 0    glipiZIDE (GLUCOTROL) 10 MG tablet Take 10 mg by mouth 2 (two) times daily with meals.      GLUCOSAM/MSM/CHONDR/VIT C/HYAL (GLUCOSAMINE-CHONDROITIN-MSM ORAL) Take 1 tablet by mouth 2 (two) times daily.      HYDROcodone-acetaminophen (NORCO) 5-325 mg per tablet Take 1 tablet by mouth every 4 (four) hours as needed for Pain. 6  tablet 0    lisinopril (PRINIVIL,ZESTRIL) 20 MG tablet Take 20 mg by mouth once daily.      metformin (GLUCOPHAGE) 1000 MG tablet Take 1 tablet (1,000 mg total) by mouth 2 (two) times daily with meals. 180 tablet 3    ondansetron (ZOFRAN-ODT) 4 MG TbDL Take 1 tablet (4 mg total) by mouth every 8 (eight) hours as needed (nausea/vomiting). 12 tablet 0    semaglutide (OZEMPIC) 0.25 mg or 0.5 mg (2 mg/3 mL) pen injector Inject 0.5 mg into the skin every 7 days. 6 mL 1    aluminum & magnesium hydroxide-simethicone (MAALOX MAXIMUM STRENGTH) 400-400-40 mg/5 mL suspension Take 15 mLs by mouth every 6 (six) hours as needed for Indigestion.  0    famotidine (PEPCID) 20 MG tablet Take 1 tablet (20 mg total) by mouth 2 (two) times daily. 20 tablet 0     No current facility-administered medications for this visit.       Past Medical History:   Diagnosis Date    Chronic kidney disease     Diabetes mellitus, type 2     Hypertension      No past surgical history on file.  Family History       Problem Relation (Age of Onset)    Diabetes Sister    Heart attack Mother, Father    Hypertension Mother, Father    No Known Problems Brother, Maternal Aunt, Maternal Uncle, Paternal Aunt, Paternal Uncle, Maternal Grandmother, Maternal Grandfather, Paternal Grandmother, Paternal Grandfather          Social History     Socioeconomic History    Marital status:    Occupational History     Employer: [employer]   Tobacco Use    Smoking status: Never    Smokeless tobacco: Never   Substance and Sexual Activity    Alcohol use: No    Drug use: No    Sexual activity: Yes     Partners: Female       Review of Systems  14 point ROS was negative    OBJECTIVE:     Vital Signs  Pain Score:   3  There is no height or weight on file to calculate BMI.    Physical Exam:    Constitutional: He appears well-developed and well-nourished.     Eyes: Pupils are equal, round, and reactive to light.     Cardiovascular: Normal rate and regular rhythm.     Abdominal:  Soft.     Psych/Behavior: He is alert. He is oriented to person, place, and time. He has a normal mood and affect.     Musculoskeletal: Gait is normal.        Neck: Range of motion is limited.        Back: Range of motion is limited.     Neurological:        Coordination: He has a normal Romberg Test and normal tandem walking coordination.        Sensory: There is no sensory deficit in the trunk. There is no sensory deficit in the extremities.        DTRs: DTRs are DTRS NORMAL AND SYMMETRICnormal and symmetric.        Cranial nerves: Cranial nerve(s) II, III, IV, V, VI, VII, VIII, IX, X, XI and XII are intact.     5/5 in BUE except 4+/5 in bilateral HI left worse than right  5/5 in BLE    No pierce's bilaterally    Mild TTP along left cervical paraspinal    Diagnostic Results:  MRI c spine: C3/4 mild to moderate central w/o significant foraminal stenosis, C4-6 severe central and NF stenosis, C6/7 mild central with severe right and moderate to severe left NF stenosis.  CT c spine: no OPLL  Reviewed    ASSESSMENT/PLAN:     59 M with chronic neck pain and BUE radicular pain presents for evaluation of multilevel cervical stenosis.  He describes symptoms consistent with mild cervical myelopathy and more severe radiculopathy.  I discussed his imaging findings which show severe stenosis from C4-6.  I don't feel strongly that he is symptomatic from the C3/4 stenosis however, I do feel that the NF stenosis at C6/7 is playing a role into his current symptoms.  I have proposed a C4-7 ACDF.  Will plan for mid march of 2025.

## 2025-01-31 ENCOUNTER — TELEPHONE (OUTPATIENT)
Dept: NEUROSURGERY | Facility: CLINIC | Age: 60
End: 2025-01-31
Payer: MEDICAID

## 2025-01-31 DIAGNOSIS — M47.12 CERVICAL SPONDYLOSIS WITH MYELOPATHY: ICD-10-CM

## 2025-01-31 DIAGNOSIS — M50.20 HNP (HERNIATED NUCLEUS PULPOSUS), CERVICAL: Primary | ICD-10-CM

## 2025-02-03 ENCOUNTER — TELEPHONE (OUTPATIENT)
Dept: ORTHOPEDICS | Facility: CLINIC | Age: 60
End: 2025-02-03
Payer: MEDICAID

## 2025-02-03 NOTE — TELEPHONE ENCOUNTER
Attempted to contact pt in regards to appointment on Thursday needing more info. Pt did not answer, LVM

## 2025-02-04 ENCOUNTER — TELEPHONE (OUTPATIENT)
Dept: ORTHOPEDICS | Facility: CLINIC | Age: 60
End: 2025-02-04
Payer: MEDICAID

## 2025-02-04 NOTE — TELEPHONE ENCOUNTER
Called and spoke with pt in regards to appointment on Thursday. Pt explained to me that he was unsure of this appointment and he saw an orthopedic last month and is suppose to be having a procedure and follow up next week. I explained to pt that unfortunately I will have to cancel his appointment with [facility] since he is established with an orthopedic and is scheduled with them on the 12th with [facility]. Pt was very understanding and appointment with [facility] is cancelled

## 2025-02-05 DIAGNOSIS — Z01.818 PREOPERATIVE TESTING: Primary | ICD-10-CM

## 2025-02-05 NOTE — ANESTHESIA PAT ROS NOTE
3/11/2025 Jerson Isabel is a 59 y.o., male with CERVICAL SPONDYLOSIS WITH MYELOPATHY AND HERNIATED NUCLEUS PULPOSUS,arrives for preop anesthesia assessment.          3/7/2025   forgot his preop anesthesia appointment was today 3/7/2025.   Rescheduled to see anesthesia on Tuesday 3/10/2025.  He verbalizes understanding..      Pre-op Assessment    I have reviewed the Patient Summary Reports.     I have reviewed the Nursing Notes. I have reviewed the NPO Status.   I have reviewed the Medications.     Review of Systems  Anesthesia Hx:  No problems with previous Anesthesia             Denies Family Hx of Anesthesia complications.    Denies Personal Hx of Anesthesia complications.                    Social:  No Alcohol Use, Non-Smoker   Socioeconomic History   Occupation  Employed at [employer]  Marital Status    Catalina Isabel  Spouse  [phone]       Family History   Mother ()               Heart attack   Hypertension  Father ()                Heart attack   Hypertension  Sister Diabetes          Hematology/Oncology:    Oncology Normal    -- Denies Anemia:                                  EENT/Dental:  EENT/Dental Normal           Cardiovascular:  Exercise tolerance: poor   Hypertension       Denies Angina.       Denies CODY.                              Pulmonary:      Shortness of breath  Sleep Apnea                Renal/:  Chronic Renal Disease, CKD renal calculi               Hepatic/GI:   Denies PUD.   Denies GERD. Denies Liver Disease.  Denies Hepatitis. Ozempic on hold Taking GLP-1 Agonists Instructed to Hold for 7 Days           Musculoskeletal:  Arthritis    Musculoskeletal General/Symptoms: neck pain. Functional capacity is unlimited.   Joint Disease:  Arthritis, Osteoarthritis, spine     Spine Disorders: cervical Degenerative disease, Disc disease and Chronic Pain       Cervical Spine Disorder, Cervical Disc Disease, Myelopathy, Radiculopathy     Neurological:  Denies TIA.  Denies CVA.  Neuromuscular Disease,   Denies Seizures.    Right hand--dropping       Chronic Pain Syndrome Pain Etiology/Diagnosis, Osteoarthritis, spine, Cervical Radiculopathy  Peripheral Neuropathy                        Neuromuscular Disease   Endocrine:  Diabetes, type 2 Denies Hypothyroidism.  Denies Hyperthyroidism. Patient fasting for Ramadan.      Denies Obesity / BMI > 30, Denies Morbid Obesity / BMI > 40      Physical Exam  General: Well nourished, Cooperative, Alert and Oriented    Airway:  Mallampati: II   Mouth Opening: Normal  Tongue: Normal  Neck ROM: Flexion Decreased, Left Lateral Motion Decreased, Right Lateral Motion Decreased, Extension Decreased    Dental:  Broken teeth; [place].  Chest/Lungs:  Clear to auscultation, Normal Respiratory Rate    Heart:  Rate: Normal  Rhythm: Regular Rhythm  Sounds: Normal          Anesthesia Assessment: Preoperative EQUATION    Planned Procedure: Procedure(s) (LRB):  C4-7 ACDF (N/A)  Requested Anesthesia Type:General  Surgeon: Boogie Long DO  Service: Neurosurgery  Known or anticipated Date of Surgery:3/12/2025    Surgeon notes: reviewed    Electronic QUestionnaire Assessment completed via nurse interview with patient.        Triage considerations:     Previous anesthesia records:No problems and Not available    Last PCP note: outside [facility]   Subspecialty notes: Neurosurgery    Other important co-morbidities:PER EPIC:  DM2, HTN, Obesity, and CERVICAL SPONDYLOSIS WITH MYELOPATHY, HNP- CERVICAL, CKD       Tests already available:  Available tests,  within 3 months , within [facility] .   1/30/2025 CT CERVICAL SPINE W/O CONTRAST          Instructions given. (See in Nurse's note)    Optimization:  Anesthesia Preop Clinic Assessment  Indicated    Medical Opinion Indicated           Plan:    Testing:  BMP, CBC, EKG, PT/INR, and T&S   Pre-anesthesia  visit       Visit focus: concerns in complex and/or prolonged anesthesia     Consultation:Patient's  PCP for re-evaluation     Patient  has previously scheduled Medical Appointment:  .2/6 ORTHO    Navigation: Tests Scheduled. TBD             Consults scheduled.TBD             Results will be tracked by Preop Clinic.      3/6 Medical clearance given by Dr. Elle Powell on 3/5. Labs, EKG and CXR done with . ( All in media)  Tammie Morgan RN BSN

## 2025-02-05 NOTE — PRE-PROCEDURE INSTRUCTIONS
Patient stated has not had any problem with anesthesia in the past. Will need medical clearance from your PCP, Dr. Powell. He will make an appt.. Will need poc appt, labs, & EKG. Our  will call to set up these appts. He said he takes ASA 81 for prevention.        Preop instructions given. Hold aspirin, aspirin containing products, nsaids( Aleve, Advil, Motrin, Ibuprofen, Naprosyn, Naproxen, Voltaren, Diclofenac, Mobic, Meloxicam, Celebrex, Celecoxib), vitamins and supplements( Maalox, Glucosamine Chondroitin) one week prior to surgery.    Hold Ozempic one week prior to surgery.. May take Tylenol. ( Also mailed these instructions to patient)  Verbalizes understanding.

## 2025-02-26 NOTE — PRE-PROCEDURE INSTRUCTIONS
I called patient to remind him to make appt for his  medical clearance with his PCP,Dr. Powell. He said he had not made an appt as of yet. But will call for an appt.I reminded him that clinics are closed for [facility] and he should call today to set up an appt . Verbalizes understanding.

## 2025-03-05 NOTE — DISCHARGE INSTRUCTIONS
Your surgery has been scheduled for:__3/12/2025___    You should report to:[facility], located on the [facility] side of the            Second floor of the [facility] ([phone])    Please Note   Tell your doctor if you take Aspirin, products containing Aspirin, herbal medications  or blood thinners, such as Coumadin, Ticlid, or Plavix.  (Consult your provider regarding holding or stopping before surgery).  Arrange for someone to drive you home following surgery.  You will not be allowed to leave the surgical facility alone or drive yourself home following sedation and anesthesia.    Before Surgery  Stop taking all herbal medications, vitamins, and supplements 7 days prior to surgery  No Motrin/Advil (Ibuprofen) 7 days before surgery  No Aleve (Naproxen) 7 days before surgery  Stop Taking Asprin, products containing Asprin _7____days before surgery  Stop taking blood thinners_______days before surgery  No Goody's/BC  Powder 7 days before surgery  Refrain from drinking alcoholic beverages for 24hours before and after surgery  Stop or limit smoking _7________days before surgery  You may take Tylenol for pain    Night before Surgery  Do not eat or drink after midnight  Take a shower or bath (shower is recommended).  Bathe with Hibiclens soap or an antibacterial soap from the neck down.  If not supplied by your surgeon, hibiclens soap will need to be purchased over the counter in pharmacy.  Rinse soap off thoroughly.  Shampoo your hair with your regular shampoo    The Day of Surgery  Take another bath or shower with hibiclens or any antibacterial soap, to reduce the chance of infection.  Take heart and blood pressure medications with a small sip of water, as advised by the perioperative team.  Do not take fluid pills  You may brush your teeth and rinse your mouth, but do not swall any additional water.   Do not apply perfumes, powder, body lotions or deodorant on the day of  surgery.  Nail polish should be removed.  Do not wear makeup or moisturizer  Wear comfortable clothes, such as a button front shirt and loose fitting pants.  Leave all jewelry, including body piercings, and valuables at home.    Bring any devices you will neeed after surgery such as crutches or canes.  If you have sleep apnea, please bring your CPAP machine  In the event that your physical condition changes including the onset of a cold or respiratory illness, or if you have to delay or cancel your surgery, please notify your surgeon.     Anesthesia: General Anesthesia     You are watched continuously during your procedure by your anesthesia provider.     Youre due to have surgery. During surgery, youll be given medicine called anesthesia or anesthetic. This will keep you comfortable and pain-free. Your anesthesia provider will use general anesthesia.  What is general anesthesia?  General anesthesia puts you into a state like deep sleep. It goes into the bloodstream (IV anesthetics), into the lungs (gas anesthetics), or both. You feel nothing during the procedure. You will not remember it. During the procedure, the anesthesia provider monitors you continuously. He or she checks your heart rate and rhythm, blood pressure, breathing, and blood oxygen.  IV anesthetics. IV anesthetics are given through an IV line in your arm. Theyre often given first. This is so you are asleep before a gas anesthetic is started. Some kinds of IV anesthetics relieve pain. Others relax you. Your doctor will decide which kind is best in your case.  Gas anesthetics. Gas anesthetics are breathed into the lungs. They are often used to keep you asleep. They can be given through a facemask or a tube placed in your larynx or trachea (breathing tube).  If you have a facemask, your anesthesia provider will most likely place it over your nose and mouth while youre still awake. Youll breathe oxygen through the mask as your IV anesthetic is  started. Gas anesthetic may be added through the mask.  If you have a tube in the larynx or trachea, it will be inserted into your throat after youre asleep.  Anesthesia tools and medicines  You will likely have:  IV anesthetics. These are put into an IV line into your bloodstream.  Gas anesthetics. You breathe these anesthetics into your lungs, where they pass into your bloodstream.  Pulse oximeter. This is a small clip that is attached to the end of your finger. This measures your blood oxygen level.  Electrocardiography leads (electrodes). These are small sticky pads that are placed on your chest. They record your heart rate and rhythm.  Blood pressure cuff. This reads your blood pressure.  Risks and possible complications  General anesthesia has some risks. These include:  Breathing problems  Nausea and vomiting  Sore throat or hoarseness (usually temporary)  Allergic reaction to the anesthetic  Irregular heartbeat (rare)  Cardiac arrest (rare)   Anesthesia safety  Follow all instructions you are given for how long not to eat or drink before your procedure.  Be sure your doctor knows what medicines and drugs you take. This includes over-the-counter medicines, herbs, supplements, alcohol or other drugs. You will be asked when those were last taken.  Have an adult family member or friend drive you home after the procedure.  For the first 24 hours after your surgery:  Do not drive or use heavy equipment.  Do not make important decisions or sign legal documents. If important decisions or signing legal documents is necessary during the first 24 hours after surgery, have a trusted family member or spouse act on your behalf.  Avoid alcohol.  Have a responsible adult stay with you. He or she can watch for problems and help keep you safe.  Date Last Reviewed: 12/1/2016 © 2000-2017 [employer]. [address] [place] [address], [place] [address]. All rights reserved. This information is not intended as a substitute  for professional medical care. Always follow your healthcare professional's instructions.

## 2025-03-07 ENCOUNTER — HOSPITAL ENCOUNTER (OUTPATIENT)
Dept: PREADMISSION TESTING | Facility: HOSPITAL | Age: 60
Discharge: HOME | End: 2025-03-07
Attending: STUDENT IN AN ORGANIZED HEALTH CARE EDUCATION/TRAINING PROGRAM
Payer: MEDICAID

## 2025-03-11 ENCOUNTER — HOSPITAL ENCOUNTER (OUTPATIENT)
Dept: CARDIOLOGY | Facility: CLINIC | Age: 60
Discharge: HOME | End: 2025-03-11
Payer: MEDICAID

## 2025-03-11 ENCOUNTER — HOSPITAL ENCOUNTER (OUTPATIENT)
Dept: PREADMISSION TESTING | Facility: HOSPITAL | Age: 60
Discharge: HOME | End: 2025-03-11
Attending: STUDENT IN AN ORGANIZED HEALTH CARE EDUCATION/TRAINING PROGRAM
Payer: MEDICAID

## 2025-03-11 ENCOUNTER — ANESTHESIA EVENT (OUTPATIENT)
Dept: SURGERY | Facility: HOSPITAL | Age: 60
End: 2025-03-11
Payer: MEDICAID

## 2025-03-11 VITALS
OXYGEN SATURATION: 95 % | TEMPERATURE: 98 F | DIASTOLIC BLOOD PRESSURE: 69 MMHG | SYSTOLIC BLOOD PRESSURE: 120 MMHG | BODY MASS INDEX: 29.69 KG/M2 | HEART RATE: 72 BPM | WEIGHT: 189.13 LBS | HEIGHT: 67 IN

## 2025-03-11 DIAGNOSIS — Z01.818 PREOPERATIVE TESTING: ICD-10-CM

## 2025-03-11 LAB
OHS QRS DURATION: 94 MS
OHS QTC CALCULATION: 442 MS

## 2025-03-11 PROCEDURE — 93010 ELECTROCARDIOGRAM REPORT: CPT | Mod: S$PBB,,, | Performed by: INTERNAL MEDICINE

## 2025-03-11 PROCEDURE — 93005 ELECTROCARDIOGRAM TRACING: CPT | Mod: PBBFAC | Performed by: INTERNAL MEDICINE

## 2025-03-11 NOTE — DISCHARGE INSTRUCTIONS
Your surgery has been scheduled for:_____3/12/2025______    You should report to:[facility], located on the [facility] side of the            Second floor of the [facility] ([phone])    Please Note   Tell your doctor if you take Aspirin, products containing Aspirin, herbal medications  or blood thinners, such as Coumadin, Ticlid, or Plavix.  (Consult your provider regarding holding or stopping before surgery).  Arrange for someone to drive you home following surgery.  You will not be allowed to leave the surgical facility alone or drive yourself home following sedation and anesthesia.    Before Surgery  Stop taking all herbal medications, vitamins, and supplements 7 days prior to surgery  No Motrin/Advil (Ibuprofen) 7 days before surgery  No Aleve (Naproxen) 7 days before surgery  Stop Taking Asprin, products containing Asprin _81MG  HOLD DAY OF SURGERY  Stop taking blood thinners_______days before surgery  No Goody's/BC  Powder 7 days before surgery  Refrain from drinking alcoholic beverages for 24hours before and after surgery  Stop or limit smoking _____7____days before surgery  You may take Tylenol for pain    Night before Surgery  Do not eat or drink after midnight  Take a shower or bath (shower is recommended).  Bathe with Hibiclens soap or an antibacterial soap from the neck down.  If not supplied by your surgeon, hibiclens soap will need to be purchased over the counter in pharmacy.  Rinse soap off thoroughly.  Shampoo your hair with your regular shampoo    The Day of Surgery  Take another bath or shower with hibiclens or any antibacterial soap, to reduce the chance of infection.  Take heart and blood pressure medications with a small sip of water, as advised by the perioperative team.  Do not take fluid pills  You may brush your teeth and rinse your mouth, but do not swall any additional water.   Do not apply perfumes, powder, body lotions or deodorant on the day  of surgery.  Nail polish should be removed.  Do not wear makeup or moisturizer  Wear comfortable clothes, such as a button front shirt and loose fitting pants.  Leave all jewelry, including body piercings, and valuables at home.    Bring any devices you will neeed after surgery such as crutches or canes.  If you have sleep apnea, please bring your CPAP machine  In the event that your physical condition changes including the onset of a cold or respiratory illness, or if you have to delay or cancel your surgery, please notify your surgeon.     Anesthesia: General Anesthesia     You are watched continuously during your procedure by your anesthesia provider.     Youre due to have surgery. During surgery, youll be given medicine called anesthesia or anesthetic. This will keep you comfortable and pain-free. Your anesthesia provider will use general anesthesia.  What is general anesthesia?  General anesthesia puts you into a state like deep sleep. It goes into the bloodstream (IV anesthetics), into the lungs (gas anesthetics), or both. You feel nothing during the procedure. You will not remember it. During the procedure, the anesthesia provider monitors you continuously. He or she checks your heart rate and rhythm, blood pressure, breathing, and blood oxygen.  IV anesthetics. IV anesthetics are given through an IV line in your arm. Theyre often given first. This is so you are asleep before a gas anesthetic is started. Some kinds of IV anesthetics relieve pain. Others relax you. Your doctor will decide which kind is best in your case.  Gas anesthetics. Gas anesthetics are breathed into the lungs. They are often used to keep you asleep. They can be given through a facemask or a tube placed in your larynx or trachea (breathing tube).  If you have a facemask, your anesthesia provider will most likely place it over your nose and mouth while youre still awake. Youll breathe oxygen through the mask as your IV anesthetic is  started. Gas anesthetic may be added through the mask.  If you have a tube in the larynx or trachea, it will be inserted into your throat after youre asleep.  Anesthesia tools and medicines  You will likely have:  IV anesthetics. These are put into an IV line into your bloodstream.  Gas anesthetics. You breathe these anesthetics into your lungs, where they pass into your bloodstream.  Pulse oximeter. This is a small clip that is attached to the end of your finger. This measures your blood oxygen level.  Electrocardiography leads (electrodes). These are small sticky pads that are placed on your chest. They record your heart rate and rhythm.  Blood pressure cuff. This reads your blood pressure.  Risks and possible complications  General anesthesia has some risks. These include:  Breathing problems  Nausea and vomiting  Sore throat or hoarseness (usually temporary)  Allergic reaction to the anesthetic  Irregular heartbeat (rare)  Cardiac arrest (rare)   Anesthesia safety  Follow all instructions you are given for how long not to eat or drink before your procedure.  Be sure your doctor knows what medicines and drugs you take. This includes over-the-counter medicines, herbs, supplements, alcohol or other drugs. You will be asked when those were last taken.  Have an adult family member or friend drive you home after the procedure.  For the first 24 hours after your surgery:  Do not drive or use heavy equipment.  Do not make important decisions or sign legal documents. If important decisions or signing legal documents is necessary during the first 24 hours after surgery, have a trusted family member or spouse act on your behalf.  Avoid alcohol.  Have a responsible adult stay with you. He or she can watch for problems and help keep you safe.  Date Last Reviewed: 12/1/2016 © 2000-2017 [employer]. [address] [place] [address], [place] [address]. All rights reserved. This information is not intended as a substitute  for professional medical care. Always follow your healthcare professional's instructions.

## 2025-03-12 ENCOUNTER — ANESTHESIA (OUTPATIENT)
Dept: SURGERY | Facility: HOSPITAL | Age: 60
End: 2025-03-12
Payer: MEDICAID

## 2025-03-12 ENCOUNTER — HOSPITAL ENCOUNTER (INPATIENT)
Facility: HOSPITAL | Age: 60
LOS: 2 days | Discharge: HOME | DRG: 029 | End: 2025-03-14
Attending: STUDENT IN AN ORGANIZED HEALTH CARE EDUCATION/TRAINING PROGRAM | Admitting: STUDENT IN AN ORGANIZED HEALTH CARE EDUCATION/TRAINING PROGRAM
Payer: MEDICAID

## 2025-03-12 DIAGNOSIS — M54.12 CERVICAL RADICULOPATHY: ICD-10-CM

## 2025-03-12 LAB
ANION GAP SERPL CALC-SCNC: 10 MMOL/L (ref 8–16)
APTT PPP: 26.6 SEC (ref 21–32)
BACTERIA #/AREA URNS AUTO: NORMAL /HPF
BASOPHILS # BLD AUTO: 0.06 K/UL (ref 0–0.2)
BASOPHILS NFR BLD: 1.1 % (ref 0–1.9)
BILIRUB UR QL STRIP: NEGATIVE
BUN SERPL-MCNC: 23 MG/DL (ref 6–20)
CALCIUM SERPL-MCNC: 9.5 MG/DL (ref 8.7–10.5)
CHLORIDE SERPL-SCNC: 104 MMOL/L (ref 95–110)
CLARITY UR REFRACT.AUTO: CLEAR
CO2 SERPL-SCNC: 24 MMOL/L (ref 23–29)
COLOR UR AUTO: YELLOW
CREAT SERPL-MCNC: 0.8 MG/DL (ref 0.5–1.4)
DIFFERENTIAL METHOD BLD: ABNORMAL
EOSINOPHIL # BLD AUTO: 0.2 K/UL (ref 0–0.5)
EOSINOPHIL NFR BLD: 4.2 % (ref 0–8)
ERYTHROCYTE [DISTWIDTH] IN BLOOD BY AUTOMATED COUNT: 13.7 % (ref 11.5–14.5)
EST. GFR  (NO RACE VARIABLE): >60 ML/MIN/1.73 M^2
GLUCOSE SERPL-MCNC: 131 MG/DL (ref 70–110)
GLUCOSE UR QL STRIP: ABNORMAL
HCT VFR BLD AUTO: 46.2 % (ref 40–54)
HGB BLD-MCNC: 15 G/DL (ref 14–18)
HGB UR QL STRIP: NEGATIVE
IMM GRANULOCYTES # BLD AUTO: 0.03 K/UL (ref 0–0.04)
IMM GRANULOCYTES NFR BLD AUTO: 0.5 % (ref 0–0.5)
INR PPP: 1 (ref 0.8–1.2)
KETONES UR QL STRIP: ABNORMAL
LEUKOCYTE ESTERASE UR QL STRIP: NEGATIVE
LYMPHOCYTES # BLD AUTO: 1.3 K/UL (ref 1–4.8)
LYMPHOCYTES NFR BLD: 22.7 % (ref 18–48)
MCH RBC QN AUTO: 29.1 PG (ref 27–31)
MCHC RBC AUTO-ENTMCNC: 32.5 G/DL (ref 32–36)
MCV RBC AUTO: 90 FL (ref 82–98)
MICROSCOPIC COMMENT: NORMAL
MONOCYTES # BLD AUTO: 0.4 K/UL (ref 0.3–1)
MONOCYTES NFR BLD: 7.3 % (ref 4–15)
NEUTROPHILS # BLD AUTO: 3.5 K/UL (ref 1.8–7.7)
NEUTROPHILS NFR BLD: 64.2 % (ref 38–73)
NITRITE UR QL STRIP: NEGATIVE
NRBC BLD-RTO: 0 /100 WBC
PH UR STRIP: 6 [PH] (ref 5–8)
PLATELET # BLD AUTO: 137 K/UL (ref 150–450)
PMV BLD AUTO: 12.3 FL (ref 9.2–12.9)
POCT GLUCOSE: 128 MG/DL (ref 70–110)
POCT GLUCOSE: 134 MG/DL (ref 70–110)
POCT GLUCOSE: 148 MG/DL (ref 70–110)
POTASSIUM SERPL-SCNC: 4.2 MMOL/L (ref 3.5–5.1)
PROT UR QL STRIP: NEGATIVE
PROTHROMBIN TIME: 11 SEC (ref 9–12.5)
RBC # BLD AUTO: 5.15 M/UL (ref 4.6–6.2)
SODIUM SERPL-SCNC: 138 MMOL/L (ref 136–145)
SP GR UR STRIP: 1.02 (ref 1–1.03)
URN SPEC COLLECT METH UR: ABNORMAL
WBC # BLD AUTO: 5.51 K/UL (ref 3.9–12.7)
YEAST UR QL AUTO: NORMAL

## 2025-03-12 PROCEDURE — 71000033 HC RECOVERY, INTIAL HOUR: Performed by: STUDENT IN AN ORGANIZED HEALTH CARE EDUCATION/TRAINING PROGRAM

## 2025-03-12 PROCEDURE — 37000008 HC ANESTHESIA 1ST 15 MINUTES: Performed by: STUDENT IN AN ORGANIZED HEALTH CARE EDUCATION/TRAINING PROGRAM

## 2025-03-12 PROCEDURE — 81001 URINALYSIS AUTO W/SCOPE: CPT | Performed by: STUDENT IN AN ORGANIZED HEALTH CARE EDUCATION/TRAINING PROGRAM

## 2025-03-12 PROCEDURE — 63600175 PHARM REV CODE 636 W HCPCS: Mod: JZ,TB

## 2025-03-12 PROCEDURE — C1889 IMPLANT/INSERT DEVICE, NOC: HCPCS | Performed by: STUDENT IN AN ORGANIZED HEALTH CARE EDUCATION/TRAINING PROGRAM

## 2025-03-12 PROCEDURE — 85730 THROMBOPLASTIN TIME PARTIAL: CPT | Performed by: STUDENT IN AN ORGANIZED HEALTH CARE EDUCATION/TRAINING PROGRAM

## 2025-03-12 PROCEDURE — 82962 GLUCOSE BLOOD TEST: CPT | Performed by: STUDENT IN AN ORGANIZED HEALTH CARE EDUCATION/TRAINING PROGRAM

## 2025-03-12 PROCEDURE — 63600175 PHARM REV CODE 636 W HCPCS: Performed by: STUDENT IN AN ORGANIZED HEALTH CARE EDUCATION/TRAINING PROGRAM

## 2025-03-12 PROCEDURE — 36000710: Performed by: STUDENT IN AN ORGANIZED HEALTH CARE EDUCATION/TRAINING PROGRAM

## 2025-03-12 PROCEDURE — C1729 CATH, DRAINAGE: HCPCS | Performed by: STUDENT IN AN ORGANIZED HEALTH CARE EDUCATION/TRAINING PROGRAM

## 2025-03-12 PROCEDURE — 27201423 OPTIME MED/SURG SUP & DEVICES STERILE SUPPLY: Performed by: STUDENT IN AN ORGANIZED HEALTH CARE EDUCATION/TRAINING PROGRAM

## 2025-03-12 PROCEDURE — 25000003 PHARM REV CODE 250: Performed by: STUDENT IN AN ORGANIZED HEALTH CARE EDUCATION/TRAINING PROGRAM

## 2025-03-12 PROCEDURE — 85025 COMPLETE CBC W/AUTO DIFF WBC: CPT | Mod: 91 | Performed by: STUDENT IN AN ORGANIZED HEALTH CARE EDUCATION/TRAINING PROGRAM

## 2025-03-12 PROCEDURE — C1769 GUIDE WIRE: HCPCS | Performed by: STUDENT IN AN ORGANIZED HEALTH CARE EDUCATION/TRAINING PROGRAM

## 2025-03-12 PROCEDURE — 80048 BASIC METABOLIC PNL TOTAL CA: CPT | Performed by: STUDENT IN AN ORGANIZED HEALTH CARE EDUCATION/TRAINING PROGRAM

## 2025-03-12 PROCEDURE — 25000003 PHARM REV CODE 250

## 2025-03-12 PROCEDURE — 22846 INSERT SPINE FIXATION DEVICE: CPT | Mod: 59,,, | Performed by: STUDENT IN AN ORGANIZED HEALTH CARE EDUCATION/TRAINING PROGRAM

## 2025-03-12 PROCEDURE — 20930 SP BONE ALGRFT MORSEL ADD-ON: CPT | Mod: ,,, | Performed by: STUDENT IN AN ORGANIZED HEALTH CARE EDUCATION/TRAINING PROGRAM

## 2025-03-12 PROCEDURE — 36620 INSERTION CATHETER ARTERY: CPT | Mod: 59,GC,, | Performed by: STUDENT IN AN ORGANIZED HEALTH CARE EDUCATION/TRAINING PROGRAM

## 2025-03-12 PROCEDURE — 22551 ARTHRD ANT NTRBDY CERVICAL: CPT | Mod: ,,, | Performed by: STUDENT IN AN ORGANIZED HEALTH CARE EDUCATION/TRAINING PROGRAM

## 2025-03-12 PROCEDURE — 36000711: Performed by: STUDENT IN AN ORGANIZED HEALTH CARE EDUCATION/TRAINING PROGRAM

## 2025-03-12 PROCEDURE — 25000003 PHARM REV CODE 250: Performed by: NURSE ANESTHETIST, CERTIFIED REGISTERED

## 2025-03-12 PROCEDURE — 71000016 HC POSTOP RECOV ADDL HR: Performed by: STUDENT IN AN ORGANIZED HEALTH CARE EDUCATION/TRAINING PROGRAM

## 2025-03-12 PROCEDURE — 0RG20A0 FUSION OF 2 OR MORE CERVICAL VERTEBRAL JOINTS WITH INTERBODY FUSION DEVICE, ANTERIOR APPROACH, ANTERIOR COLUMN, OPEN APPROACH: ICD-10-PCS | Performed by: STUDENT IN AN ORGANIZED HEALTH CARE EDUCATION/TRAINING PROGRAM

## 2025-03-12 PROCEDURE — C1713 ANCHOR/SCREW BN/BN,TIS/BN: HCPCS | Performed by: STUDENT IN AN ORGANIZED HEALTH CARE EDUCATION/TRAINING PROGRAM

## 2025-03-12 PROCEDURE — 37000009 HC ANESTHESIA EA ADD 15 MINS: Performed by: STUDENT IN AN ORGANIZED HEALTH CARE EDUCATION/TRAINING PROGRAM

## 2025-03-12 PROCEDURE — 63600175 PHARM REV CODE 636 W HCPCS: Mod: JZ,TB | Performed by: STUDENT IN AN ORGANIZED HEALTH CARE EDUCATION/TRAINING PROGRAM

## 2025-03-12 PROCEDURE — 71000015 HC POSTOP RECOV 1ST HR: Performed by: STUDENT IN AN ORGANIZED HEALTH CARE EDUCATION/TRAINING PROGRAM

## 2025-03-12 PROCEDURE — 63600175 PHARM REV CODE 636 W HCPCS: Performed by: NURSE ANESTHETIST, CERTIFIED REGISTERED

## 2025-03-12 PROCEDURE — 36415 COLL VENOUS BLD VENIPUNCTURE: CPT | Performed by: STUDENT IN AN ORGANIZED HEALTH CARE EDUCATION/TRAINING PROGRAM

## 2025-03-12 PROCEDURE — 0RB30ZZ EXCISION OF CERVICAL VERTEBRAL DISC, OPEN APPROACH: ICD-10-PCS | Performed by: STUDENT IN AN ORGANIZED HEALTH CARE EDUCATION/TRAINING PROGRAM

## 2025-03-12 PROCEDURE — 63600175 PHARM REV CODE 636 W HCPCS

## 2025-03-12 PROCEDURE — 11000001 HC ACUTE MED/SURG PRIVATE ROOM

## 2025-03-12 PROCEDURE — 22853 INSJ BIOMECHANICAL DEVICE: CPT | Mod: ,,, | Performed by: STUDENT IN AN ORGANIZED HEALTH CARE EDUCATION/TRAINING PROGRAM

## 2025-03-12 PROCEDURE — 22552 ARTHRD ANT NTRBD CERVICAL EA: CPT | Mod: ,,, | Performed by: STUDENT IN AN ORGANIZED HEALTH CARE EDUCATION/TRAINING PROGRAM

## 2025-03-12 PROCEDURE — 85610 PROTHROMBIN TIME: CPT | Performed by: STUDENT IN AN ORGANIZED HEALTH CARE EDUCATION/TRAINING PROGRAM

## 2025-03-12 DEVICE — SPACER ACCENT 6 DEG 12X14X7MM: Type: IMPLANTABLE DEVICE | Site: SPINE CERVICAL | Status: FUNCTIONAL

## 2025-03-12 DEVICE — BONE GRAFT SUBSTITUTE - SILICATE SUBSTITUTED CALCIUM PHOSPHATE - 2.6ML PACK SIZE
Type: IMPLANTABLE DEVICE | Site: SPINE CERVICAL | Status: FUNCTIONAL
Brand: ACTIFUSE SHAPE

## 2025-03-12 RX ORDER — KETAMINE HCL IN 0.9 % NACL 50 MG/5 ML
SYRINGE (ML) INTRAVENOUS
Status: DISCONTINUED | OUTPATIENT
Start: 2025-03-12 | End: 2025-03-12

## 2025-03-12 RX ORDER — OXYCODONE HYDROCHLORIDE 10 MG/1
10 TABLET ORAL EVERY 4 HOURS PRN
Status: DISCONTINUED | OUTPATIENT
Start: 2025-03-12 | End: 2025-03-14 | Stop reason: HOSPADM

## 2025-03-12 RX ORDER — ACETAMINOPHEN 325 MG/1
650 TABLET ORAL EVERY 6 HOURS
Status: DISCONTINUED | OUTPATIENT
Start: 2025-03-13 | End: 2025-03-14 | Stop reason: HOSPADM

## 2025-03-12 RX ORDER — MORPHINE SULFATE 2 MG/ML
2 INJECTION, SOLUTION INTRAMUSCULAR; INTRAVENOUS EVERY 4 HOURS PRN
Status: DISCONTINUED | OUTPATIENT
Start: 2025-03-12 | End: 2025-03-14 | Stop reason: HOSPADM

## 2025-03-12 RX ORDER — IBUPROFEN 200 MG
16 TABLET ORAL
Status: DISCONTINUED | OUTPATIENT
Start: 2025-03-12 | End: 2025-03-14 | Stop reason: HOSPADM

## 2025-03-12 RX ORDER — INSULIN ASPART 100 [IU]/ML
0-10 INJECTION, SOLUTION INTRAVENOUS; SUBCUTANEOUS
Status: DISCONTINUED | OUTPATIENT
Start: 2025-03-12 | End: 2025-03-14 | Stop reason: HOSPADM

## 2025-03-12 RX ORDER — GLUCAGON 1 MG
1 KIT INJECTION
Status: DISCONTINUED | OUTPATIENT
Start: 2025-03-12 | End: 2025-03-12 | Stop reason: HOSPADM

## 2025-03-12 RX ORDER — PROCHLORPERAZINE EDISYLATE 5 MG/ML
5 INJECTION INTRAMUSCULAR; INTRAVENOUS EVERY 6 HOURS PRN
Status: DISCONTINUED | OUTPATIENT
Start: 2025-03-12 | End: 2025-03-14 | Stop reason: HOSPADM

## 2025-03-12 RX ORDER — METHOCARBAMOL 500 MG/1
500 TABLET, FILM COATED ORAL 3 TIMES DAILY PRN
Status: DISCONTINUED | OUTPATIENT
Start: 2025-03-12 | End: 2025-03-14 | Stop reason: HOSPADM

## 2025-03-12 RX ORDER — VANCOMYCIN HYDROCHLORIDE 1 G/20ML
INJECTION, POWDER, LYOPHILIZED, FOR SOLUTION INTRAVENOUS
Status: DISCONTINUED | OUTPATIENT
Start: 2025-03-12 | End: 2025-03-12 | Stop reason: HOSPADM

## 2025-03-12 RX ORDER — LIDOCAINE HYDROCHLORIDE 10 MG/ML
1 INJECTION, SOLUTION EPIDURAL; INFILTRATION; INTRACAUDAL; PERINEURAL ONCE AS NEEDED
Status: COMPLETED | OUTPATIENT
Start: 2025-03-12 | End: 2025-03-12

## 2025-03-12 RX ORDER — HYDROMORPHONE HYDROCHLORIDE 1 MG/ML
0.2 INJECTION, SOLUTION INTRAMUSCULAR; INTRAVENOUS; SUBCUTANEOUS EVERY 5 MIN PRN
Status: COMPLETED | OUTPATIENT
Start: 2025-03-12 | End: 2025-03-12

## 2025-03-12 RX ORDER — IBUPROFEN 200 MG
24 TABLET ORAL
Status: DISCONTINUED | OUTPATIENT
Start: 2025-03-12 | End: 2025-03-14 | Stop reason: HOSPADM

## 2025-03-12 RX ORDER — OXYCODONE HYDROCHLORIDE 5 MG/1
5 TABLET ORAL EVERY 4 HOURS PRN
Status: DISCONTINUED | OUTPATIENT
Start: 2025-03-12 | End: 2025-03-14 | Stop reason: HOSPADM

## 2025-03-12 RX ORDER — ROCURONIUM BROMIDE 10 MG/ML
INJECTION, SOLUTION INTRAVENOUS
Status: DISCONTINUED | OUTPATIENT
Start: 2025-03-12 | End: 2025-03-12

## 2025-03-12 RX ORDER — ACETAMINOPHEN 10 MG/ML
INJECTION, SOLUTION INTRAVENOUS
Status: DISCONTINUED | OUTPATIENT
Start: 2025-03-12 | End: 2025-03-12

## 2025-03-12 RX ORDER — LIDOCAINE HYDROCHLORIDE AND EPINEPHRINE 10; 10 UG/ML; MG/ML
INJECTION, SOLUTION INFILTRATION; PERINEURAL
Status: DISCONTINUED | OUTPATIENT
Start: 2025-03-12 | End: 2025-03-12 | Stop reason: HOSPADM

## 2025-03-12 RX ORDER — SODIUM CHLORIDE 0.9 % (FLUSH) 0.9 %
10 SYRINGE (ML) INJECTION
Status: DISCONTINUED | OUTPATIENT
Start: 2025-03-12 | End: 2025-03-12 | Stop reason: HOSPADM

## 2025-03-12 RX ORDER — ATORVASTATIN CALCIUM 10 MG/1
10 TABLET, FILM COATED ORAL DAILY
Status: DISCONTINUED | OUTPATIENT
Start: 2025-03-13 | End: 2025-03-14 | Stop reason: HOSPADM

## 2025-03-12 RX ORDER — MUPIROCIN 20 MG/G
OINTMENT TOPICAL
Status: DISCONTINUED | OUTPATIENT
Start: 2025-03-12 | End: 2025-03-12 | Stop reason: HOSPADM

## 2025-03-12 RX ORDER — CEFAZOLIN 2 G/1
2 INJECTION, POWDER, FOR SOLUTION INTRAMUSCULAR; INTRAVENOUS
Status: DISCONTINUED | OUTPATIENT
Start: 2025-03-12 | End: 2025-03-14

## 2025-03-12 RX ORDER — MUPIROCIN 20 MG/G
1 OINTMENT TOPICAL 2 TIMES DAILY
Status: DISCONTINUED | OUTPATIENT
Start: 2025-03-12 | End: 2025-03-12 | Stop reason: HOSPADM

## 2025-03-12 RX ORDER — POLYETHYLENE GLYCOL 3350 17 G/17G
17 POWDER, FOR SOLUTION ORAL 2 TIMES DAILY
Status: DISCONTINUED | OUTPATIENT
Start: 2025-03-12 | End: 2025-03-14 | Stop reason: HOSPADM

## 2025-03-12 RX ORDER — CEFAZOLIN 2 G/1
2 INJECTION, POWDER, FOR SOLUTION INTRAMUSCULAR; INTRAVENOUS
Status: COMPLETED | OUTPATIENT
Start: 2025-03-12 | End: 2025-03-12

## 2025-03-12 RX ORDER — FENTANYL CITRATE 50 UG/ML
INJECTION, SOLUTION INTRAMUSCULAR; INTRAVENOUS
Status: DISCONTINUED | OUTPATIENT
Start: 2025-03-12 | End: 2025-03-12

## 2025-03-12 RX ORDER — MIDAZOLAM HYDROCHLORIDE 1 MG/ML
INJECTION INTRAMUSCULAR; INTRAVENOUS
Status: DISCONTINUED | OUTPATIENT
Start: 2025-03-12 | End: 2025-03-12

## 2025-03-12 RX ORDER — LIDOCAINE HYDROCHLORIDE 20 MG/ML
INJECTION INTRAVENOUS
Status: DISCONTINUED | OUTPATIENT
Start: 2025-03-12 | End: 2025-03-12

## 2025-03-12 RX ORDER — SODIUM CHLORIDE 9 MG/ML
INJECTION, SOLUTION INTRAVENOUS CONTINUOUS
Status: ACTIVE | OUTPATIENT
Start: 2025-03-12 | End: 2025-03-13

## 2025-03-12 RX ORDER — LABETALOL HYDROCHLORIDE 5 MG/ML
INJECTION, SOLUTION INTRAVENOUS
Status: DISCONTINUED | OUTPATIENT
Start: 2025-03-12 | End: 2025-03-12

## 2025-03-12 RX ORDER — ONDANSETRON HYDROCHLORIDE 2 MG/ML
INJECTION, SOLUTION INTRAVENOUS
Status: DISCONTINUED | OUTPATIENT
Start: 2025-03-12 | End: 2025-03-12

## 2025-03-12 RX ORDER — DEXMEDETOMIDINE HYDROCHLORIDE 100 UG/ML
INJECTION, SOLUTION INTRAVENOUS
Status: DISCONTINUED | OUTPATIENT
Start: 2025-03-12 | End: 2025-03-12

## 2025-03-12 RX ORDER — METHYLPREDNISOLONE ACETATE 40 MG/ML
INJECTION, SUSPENSION INTRA-ARTICULAR; INTRALESIONAL; INTRAMUSCULAR; SOFT TISSUE
Status: DISCONTINUED | OUTPATIENT
Start: 2025-03-12 | End: 2025-03-12 | Stop reason: HOSPADM

## 2025-03-12 RX ORDER — PROPOFOL 10 MG/ML
VIAL (ML) INTRAVENOUS
Status: DISCONTINUED | OUTPATIENT
Start: 2025-03-12 | End: 2025-03-12

## 2025-03-12 RX ORDER — ACETAMINOPHEN 500 MG
1000 TABLET ORAL
Status: COMPLETED | OUTPATIENT
Start: 2025-03-12 | End: 2025-03-12

## 2025-03-12 RX ORDER — ALUMINUM HYDROXIDE, MAGNESIUM HYDROXIDE, AND SIMETHICONE 1200; 120; 1200 MG/30ML; MG/30ML; MG/30ML
30 SUSPENSION ORAL EVERY 4 HOURS PRN
Status: DISCONTINUED | OUTPATIENT
Start: 2025-03-12 | End: 2025-03-14 | Stop reason: HOSPADM

## 2025-03-12 RX ORDER — GLUCAGON 1 MG
1 KIT INJECTION
Status: DISCONTINUED | OUTPATIENT
Start: 2025-03-12 | End: 2025-03-14 | Stop reason: HOSPADM

## 2025-03-12 RX ORDER — BISACODYL 10 MG/1
10 SUPPOSITORY RECTAL DAILY
Status: DISCONTINUED | OUTPATIENT
Start: 2025-03-13 | End: 2025-03-14 | Stop reason: HOSPADM

## 2025-03-12 RX ORDER — MUPIROCIN 20 MG/G
OINTMENT TOPICAL 2 TIMES DAILY
Status: DISCONTINUED | OUTPATIENT
Start: 2025-03-12 | End: 2025-03-14 | Stop reason: HOSPADM

## 2025-03-12 RX ORDER — AMOXICILLIN 250 MG
2 CAPSULE ORAL 2 TIMES DAILY
Status: DISCONTINUED | OUTPATIENT
Start: 2025-03-12 | End: 2025-03-14 | Stop reason: HOSPADM

## 2025-03-12 RX ORDER — SODIUM CHLORIDE 9 MG/ML
INJECTION, SOLUTION INTRAVENOUS CONTINUOUS
Status: DISCONTINUED | OUTPATIENT
Start: 2025-03-12 | End: 2025-03-14

## 2025-03-12 RX ORDER — DEXAMETHASONE SODIUM PHOSPHATE 4 MG/ML
INJECTION, SOLUTION INTRA-ARTICULAR; INTRALESIONAL; INTRAMUSCULAR; INTRAVENOUS; SOFT TISSUE
Status: DISCONTINUED | OUTPATIENT
Start: 2025-03-12 | End: 2025-03-12

## 2025-03-12 RX ORDER — FAMOTIDINE 20 MG/1
20 TABLET, FILM COATED ORAL 2 TIMES DAILY
Status: DISCONTINUED | OUTPATIENT
Start: 2025-03-12 | End: 2025-03-14 | Stop reason: HOSPADM

## 2025-03-12 RX ORDER — HALOPERIDOL LACTATE 5 MG/ML
0.5 INJECTION, SOLUTION INTRAMUSCULAR EVERY 10 MIN PRN
Status: DISCONTINUED | OUTPATIENT
Start: 2025-03-12 | End: 2025-03-12 | Stop reason: HOSPADM

## 2025-03-12 RX ORDER — LISINOPRIL 20 MG/1
20 TABLET ORAL DAILY
Status: DISCONTINUED | OUTPATIENT
Start: 2025-03-13 | End: 2025-03-14 | Stop reason: HOSPADM

## 2025-03-12 RX ORDER — ONDANSETRON 8 MG/1
8 TABLET, ORALLY DISINTEGRATING ORAL EVERY 6 HOURS PRN
Status: DISCONTINUED | OUTPATIENT
Start: 2025-03-12 | End: 2025-03-14 | Stop reason: HOSPADM

## 2025-03-12 RX ADMIN — ACETAMINOPHEN 650 MG: 325 TABLET ORAL at 11:03

## 2025-03-12 RX ADMIN — DEXMEDETOMIDINE 12 MCG: 100 INJECTION, SOLUTION, CONCENTRATE INTRAVENOUS at 03:03

## 2025-03-12 RX ADMIN — MORPHINE SULFATE 2 MG: 2 INJECTION, SOLUTION INTRAMUSCULAR; INTRAVENOUS at 11:03

## 2025-03-12 RX ADMIN — HYDROMORPHONE HYDROCHLORIDE 0.2 MG: 1 INJECTION, SOLUTION INTRAMUSCULAR; INTRAVENOUS; SUBCUTANEOUS at 07:03

## 2025-03-12 RX ADMIN — FAMOTIDINE 20 MG: 20 TABLET, FILM COATED ORAL at 08:03

## 2025-03-12 RX ADMIN — Medication 20 MG: at 03:03

## 2025-03-12 RX ADMIN — HYDROMORPHONE HYDROCHLORIDE 0.2 MG: 1 INJECTION, SOLUTION INTRAMUSCULAR; INTRAVENOUS; SUBCUTANEOUS at 08:03

## 2025-03-12 RX ADMIN — ACETAMINOPHEN 1000 MG: 500 TABLET ORAL at 12:03

## 2025-03-12 RX ADMIN — SUFENTANIL CITRATE 0.2 MCG/KG/HR: 50 INJECTION, SOLUTION EPIDURAL; INTRAVENOUS at 01:03

## 2025-03-12 RX ADMIN — CEFAZOLIN 2 G: 2 INJECTION, POWDER, FOR SOLUTION INTRAMUSCULAR; INTRAVENOUS at 01:03

## 2025-03-12 RX ADMIN — DEXAMETHASONE SODIUM PHOSPHATE 8 MG: 4 INJECTION, SOLUTION INTRAMUSCULAR; INTRAVENOUS at 01:03

## 2025-03-12 RX ADMIN — LIDOCAINE HYDROCHLORIDE 10 MG: 10 INJECTION, SOLUTION EPIDURAL; INFILTRATION; INTRACAUDAL; PERINEURAL at 12:03

## 2025-03-12 RX ADMIN — PROPOFOL 150 MCG/KG/MIN: 10 INJECTION, EMULSION INTRAVENOUS at 01:03

## 2025-03-12 RX ADMIN — SENNOSIDES AND DOCUSATE SODIUM 2 TABLET: 50; 8.6 TABLET ORAL at 08:03

## 2025-03-12 RX ADMIN — HYDROMORPHONE HYDROCHLORIDE 0.2 MG: 1 INJECTION, SOLUTION INTRAMUSCULAR; INTRAVENOUS; SUBCUTANEOUS at 06:03

## 2025-03-12 RX ADMIN — ONDANSETRON 4 MG: 2 INJECTION INTRAMUSCULAR; INTRAVENOUS at 05:03

## 2025-03-12 RX ADMIN — MUPIROCIN: 20 OINTMENT TOPICAL at 12:03

## 2025-03-12 RX ADMIN — LIDOCAINE HYDROCHLORIDE 100 MG: 20 INJECTION INTRAVENOUS at 01:03

## 2025-03-12 RX ADMIN — FENTANYL CITRATE 100 MCG: 50 INJECTION, SOLUTION INTRAMUSCULAR; INTRAVENOUS at 01:03

## 2025-03-12 RX ADMIN — ROCURONIUM BROMIDE 50 MG: 10 INJECTION, SOLUTION INTRAVENOUS at 01:03

## 2025-03-12 RX ADMIN — PHENYLEPHRINE HYDROCHLORIDE 0.25 MCG/KG/MIN: 10 INJECTION INTRAVENOUS at 01:03

## 2025-03-12 RX ADMIN — MIDAZOLAM HYDROCHLORIDE 2 MG: 2 INJECTION, SOLUTION INTRAMUSCULAR; INTRAVENOUS at 01:03

## 2025-03-12 RX ADMIN — LABETALOL HYDROCHLORIDE 10 MG: 5 INJECTION, SOLUTION INTRAVENOUS at 02:03

## 2025-03-12 RX ADMIN — DEXMEDETOMIDINE 12 MCG: 100 INJECTION, SOLUTION, CONCENTRATE INTRAVENOUS at 02:03

## 2025-03-12 RX ADMIN — FENTANYL CITRATE 50 MCG: 50 INJECTION, SOLUTION INTRAMUSCULAR; INTRAVENOUS at 02:03

## 2025-03-12 RX ADMIN — SODIUM CHLORIDE: 0.9 INJECTION, SOLUTION INTRAVENOUS at 01:03

## 2025-03-12 RX ADMIN — DEXAMETHASONE SODIUM PHOSPHATE 4 MG: 4 INJECTION, SOLUTION INTRAMUSCULAR; INTRAVENOUS at 01:03

## 2025-03-12 RX ADMIN — ACETAMINOPHEN 1000 MG: 10 INJECTION INTRAVENOUS at 05:03

## 2025-03-12 RX ADMIN — MUPIROCIN: 20 OINTMENT TOPICAL at 08:03

## 2025-03-12 RX ADMIN — SODIUM CHLORIDE: 0.9 INJECTION, SOLUTION INTRAVENOUS at 02:03

## 2025-03-12 RX ADMIN — CEFAZOLIN 2 G: 2 INJECTION, POWDER, FOR SOLUTION INTRAMUSCULAR; INTRAVENOUS at 08:03

## 2025-03-12 RX ADMIN — SODIUM CHLORIDE: 9 INJECTION, SOLUTION INTRAVENOUS at 07:03

## 2025-03-12 RX ADMIN — SODIUM CHLORIDE: 9 INJECTION, SOLUTION INTRAVENOUS at 12:03

## 2025-03-12 RX ADMIN — SODIUM CHLORIDE: 0.9 INJECTION, SOLUTION INTRAVENOUS at 04:03

## 2025-03-12 RX ADMIN — POLYETHYLENE GLYCOL 3350 17 G: 17 POWDER, FOR SOLUTION ORAL at 08:03

## 2025-03-12 RX ADMIN — Medication 15 MG: at 04:03

## 2025-03-12 RX ADMIN — PROPOFOL 130 MG: 10 INJECTION, EMULSION INTRAVENOUS at 01:03

## 2025-03-12 RX ADMIN — OXYCODONE HYDROCHLORIDE 10 MG: 10 TABLET ORAL at 07:03

## 2025-03-12 NOTE — PROGRESS NOTES
Patient seen post op bedside in PACU    Still waking from anesthesia  Grimacing to stim, opened eyes to voice   Localizing BUE/BLE, moves all 4 AG but not yet participatory in full motor exam  Incision flat, HV holding compression with bloody output in tubing     Dr Long updated. NSGY team to re-eval as wakes from anesthesia     Post op orders reviewed with RN

## 2025-03-12 NOTE — TRANSFER OF CARE
Anesthesia Transfer of Care Note    Patient: Jerson Isabel    Procedure(s) Performed: Procedure(s) (LRB):  C4-7 ACDF (N/A)    Patient location: PACU    Anesthesia Type: general    Transport from OR: Transported from OR on 6-10 L/min O2 by face mask with adequate spontaneous ventilation    Post pain: adequate analgesia    Post assessment: no apparent anesthetic complications    Post vital signs: stable    Level of consciousness: sedated    Nausea/Vomiting: no nausea/vomiting    Complications: none    Transfer of care protocol was followed    Last vitals: Visit Vitals  BP (!) 148/77 (BP Location: Right arm)   Pulse 75   Temp 36.7 °C (98.1 °F) (Temporal)   Resp 13   Wt 85.8 kg (189 lb 2.5 oz)   SpO2 99%   BMI 29.63 kg/m²

## 2025-03-12 NOTE — BRIEF OP NOTE
[facility] - Surgery ([facility])  Surgery Department  Operative Note    SUMMARY     Date of Procedure: 3/12/2025     Procedure: Procedure(s) (LRB):  C4-7 ACDF (N/A)     Surgeons and Role:     * Boogie Long, DO - Primary       Ernestina Pacheco MD - Res    Assisting Surgeon: None    Pre-Operative Diagnosis: Cervical spondylosis with myelopathy [M47.12]  HNP (herniated nucleus pulposus), cervical [M50.20]    Post-Operative Diagnosis: Post-Op Diagnosis Codes:     * Cervical spondylosis with myelopathy [M47.12]     * HNP (herniated nucleus pulposus), cervical [M50.20]    Anesthesia: General    Operative Findings (including complications, if any): see full op note    Description of Technical Procedures: R sided approach for C4/5, C5/6 and C6/7 ACDF with anterior cervical plate. Subplatysmal HV drain. Closed with monocryl. Intra-op XR, neuromonitoring        Estimated Blood Loss (EBL): * No values recorded between 3/12/2025  1:18 PM and 3/12/2025  5:25 PM *           Implants:   Implant Name Type Inv. Item Serial No.  Lot No. LRB No. Used Action   PIN DISTRACTION DISP 14MM - [id number]  PIN DISTRACTION DISP 14MM  B [facility] [id number] N/A 2 Implanted and Explanted   ACTIFUSE SHAPE MD CYL [id number] - [id number]  ACTIFUSE SHAPE MD CYL [id number]  Refinder by [employer] [id number] N/A 1 Implanted   SPACER ACCENT 6 DEG [id number] - [id number]  SPACER ACCENT 6 DEG [id number]  [employer]  N/A 3 Implanted   3 level 31mm plate      N/A 1 Implanted   4.0 x18mm vasd screw      N/A 4 Implanted   4.0x14mm vasd screw      N/A 4 Implanted       Specimens:   Specimen (24h ago, onward)      None                    Condition: Stable    Disposition: PACU - hemodynamically stable.    Attestation: Op Note Attestation: The attending physician was present and scrubbed for the entire procedure.

## 2025-03-12 NOTE — ANESTHESIA PROCEDURE NOTES
Arterial    Diagnosis: cervical radiculopathy    Patient location during procedure: done in OR  Timeout: 3/12/2025 1:35 PM  Procedure end time: 3/12/2025 1:38 PM    Staffing  Authorizing Provider: Tabby Manzo MD  Performing Provider: Matilde Felix MD    Staffing  Performed by: Matilde Felix MD  Authorized by: Tabby Manzo MD    Anesthesiologist was present at the time of the procedure.  Arterial  Skin Prep: chlorhexidine gluconate  Local Infiltration: none  Orientation: left  Location: radial    Catheter Size: 20 G  Catheter placement by Ultrasound guidance. Heme positive aspiration all ports.   Vessel Caliber: medium, patent, compressibility normal  Vascular Doppler:  not done  Needle advanced into vessel with real time Ultrasound guidance.Insertion Attempts: 1  Assessment  Dressing: secured with tape and tegaderm  Patient: Tolerated well

## 2025-03-12 NOTE — H&P
"HPI  The following HPI is per Dr. Long's note on 1/30/25:    "History of Present Illness:  Jerson Isabel is a 59 y.o. male who presents as a referral from Dr. Luis Enrique Benjamin for second opinion for cervical fusion. Patient reports several year history of neck pain and right arm pain. Pain is in the right arm and radiates into the last two digits of the hands, recently he developed left arm pain as well. He is a  and notes weakness in his hand  over the past several months. In addition he notes some issues with balance. He denies dropping objects or bowel/bladder issues. He was seen by Dr. Brumfield with [facility] and C3-C7 ACDF was recommended. Patient chose to forego and is here for a second opinion. He is hesitant to undergo spine surgery as he has a family member that had worsening pain and issues post op. He takes aspirin 81 mg daily for preventative reasons.      Interval fu 1/30/25:  Pt presents in fu after updated CT c spine.  He continues to endorse neck pain and BUE radicular pain right greater than left into his first 2 digits.  He also has numbness in this location.  He does feel that his hand strength has worsened.  He feels mildly unsteady when he walks.  He is a nonsmoker.     Review of patient's allergies indicates:  No Known Allergies     Current Medications          Current Outpatient Medications   Medication Sig Dispense Refill    aspirin 81 MG Chew Take 81 mg by mouth once daily.        atorvastatin (LIPITOR) 10 MG tablet Take 1 tablet (10 mg total) by mouth once daily. 90 tablet 3    erythromycin (ROMYCIN) ophthalmic ointment Place a 1/2 inch ribbon of ointment into the lower eyelid TID for 7 days 1 Tube 0    glipiZIDE (GLUCOTROL) 10 MG tablet Take 10 mg by mouth 2 (two) times daily with meals.        GLUCOSAM/MSM/CHONDR/VIT C/HYAL (GLUCOSAMINE-CHONDROITIN-MSM ORAL) Take 1 tablet by mouth 2 (two) times daily.        HYDROcodone-acetaminophen (NORCO) 5-325 mg per tablet Take 1 tablet by mouth " every 4 (four) hours as needed for Pain. 6 tablet 0    lisinopril (PRINIVIL,ZESTRIL) 20 MG tablet Take 20 mg by mouth once daily.        metformin (GLUCOPHAGE) 1000 MG tablet Take 1 tablet (1,000 mg total) by mouth 2 (two) times daily with meals. 180 tablet 3    ondansetron (ZOFRAN-ODT) 4 MG TbDL Take 1 tablet (4 mg total) by mouth every 8 (eight) hours as needed (nausea/vomiting). 12 tablet 0    semaglutide (OZEMPIC) 0.25 mg or 0.5 mg (2 mg/3 mL) pen injector Inject 0.5 mg into the skin every 7 days. 6 mL 1    aluminum & magnesium hydroxide-simethicone (MAALOX MAXIMUM STRENGTH) 400-400-40 mg/5 mL suspension Take 15 mLs by mouth every 6 (six) hours as needed for Indigestion.   0    famotidine (PEPCID) 20 MG tablet Take 1 tablet (20 mg total) by mouth 2 (two) times daily. 20 tablet 0      No current facility-administered medications for this visit.                 Past Medical History:   Diagnosis Date    Chronic kidney disease      Diabetes mellitus, type 2      Hypertension        No past surgical history on file.  Family History         Problem Relation (Age of Onset)     Diabetes Sister     Heart attack Mother, Father     Hypertension Mother, Father     No Known Problems Brother, Maternal Aunt, Maternal Uncle, Paternal Aunt, Paternal Uncle, Maternal Grandmother, Maternal Grandfather, Paternal Grandmother, Paternal Grandfather             Social History            Socioeconomic History    Marital status:    Occupational History       Employer: [employer]   Tobacco Use    Smoking status: Never    Smokeless tobacco: Never   Substance and Sexual Activity    Alcohol use: No    Drug use: No    Sexual activity: Yes       Partners: Female         Review of Systems  14 point ROS was negative     OBJECTIVE:      Vital Signs  Pain Score:   3  There is no height or weight on file to calculate BMI.     Physical Exam:     Constitutional: He appears well-developed and well-nourished.      Eyes: Pupils are equal, round, and  "reactive to light.      Cardiovascular: Normal rate and regular rhythm.      Abdominal: Soft.      Psych/Behavior: He is alert. He is oriented to person, place, and time. He has a normal mood and affect.      Musculoskeletal: Gait is normal.        Neck: Range of motion is limited.        Back: Range of motion is limited.      Neurological:        Coordination: He has a normal Romberg Test and normal tandem walking coordination.        Sensory: There is no sensory deficit in the trunk. There is no sensory deficit in the extremities.        DTRs: DTRs are DTRS NORMAL AND SYMMETRICnormal and symmetric.        Cranial nerves: Cranial nerve(s) II, III, IV, V, VI, VII, VIII, IX, X, XI and XII are intact.      5/5 in BUE except 4+/5 in bilateral HI left worse than right  5/5 in BLE     No pierce's bilaterally     Mild TTP along left cervical paraspinal     Diagnostic Results:  MRI c spine: C3/4 mild to moderate central w/o significant foraminal stenosis, C4-6 severe central and NF stenosis, C6/7 mild central with severe right and moderate to severe left NF stenosis.  CT c spine: no OPLL  Reviewed     ASSESSMENT/PLAN:      59 M with chronic neck pain and BUE radicular pain presents for evaluation of multilevel cervical stenosis.  He describes symptoms consistent with mild cervical myelopathy and more severe radiculopathy.  I discussed his imaging findings which show severe stenosis from C4-6.  I don't feel strongly that he is symptomatic from the C3/4 stenosis however, I do feel that the NF stenosis at C6/7 is playing a role into his current symptoms.  I have proposed a C4-7 ACDF.  Will plan for mid march of 2025.   "    In addendum to Dr. Long's clinic note, the patient describes stable symptomology aside from slight worsening of R HG (4/5), has been NPO since midnight, and has not taken any anti-plt/coag medications in the last 72 hours.    A&P    --Patient evaluated prior to surgery. States stopped ASA81/Ozempic as " instructed.   --All diagnostics and imaging reviewed  --Patient NPO since MN  --No anti-coag/plt medication in the last 72h  --Patient consented and all questions answered. Site marked.  --Further reccs to follow s/p surgery    Ernestina Mcadams MD  Neurosurgery  [facility]

## 2025-03-12 NOTE — ANESTHESIA PREPROCEDURE EVALUATION
[facility]  Anesthesia Pre-Operative Evaluation         Patient Name: Jerson Isabel  YOB: 1965  MRN: [id number]    SUBJECTIVE:     Pre-operative evaluation for Procedure(s) (LRB):  C4-7 ACDF (N/A)     03/11/2025    Jerson Isabel is a 59 y.o. male w/ a significant PMHx of CKD, HTN, T2DM, chronic neck pain and BUE radicular pain.    Patient now presents for the above procedure(s).    No previous echo on file    LDA: None documented.     Prev airway: None documented.    Drips: None documented.      Problem List[1]    Review of patient's allergies indicates:  No Known Allergies    Current Inpatient Medications:      Medications Ordered Prior to Encounter[2]    No past surgical history on file.    Social History[3]    OBJECTIVE:     Vital Signs Range (Last 24H):  Temp:  [36.9 °C (98.4 °F)]   Pulse:  [72]   BP: (120)/(69)   SpO2:  [95 %]       Significant Labs:  Lab Results   Component Value Date    WBC 4.79 02/03/2024    HGB 14.3 02/03/2024    HCT 42.1 02/03/2024     (L) 02/03/2024    CHOL 138 06/19/2015    TRIG 338 (H) 06/19/2015    HDL 30 (L) 06/19/2015    ALT 28 02/03/2024    AST 20 02/03/2024     02/03/2024    K 4.1 02/03/2024     02/03/2024    CREATININE 0.9 02/03/2024    BUN 17 02/03/2024    CO2 26 02/03/2024    TSH 0.576 03/12/2015    HGBA1C 8.7 (H) 06/19/2015       Diagnostic Studies: No relevant studies.    EKG:   Results for orders placed or performed during the hospital encounter of 03/11/25   EKG 12-lead    Collection Time: 03/11/25  2:36 PM   Result Value Ref Range    QRS Duration 94 ms    [facility] QTC Calculation 442 ms    Narrative    Test Reason : Z01.818,    Vent. Rate :  72 BPM     Atrial Rate :  72 BPM     P-R Int : 166 ms          QRS Dur :  94 ms      QT Int : 404 ms       P-R-T Axes :  18  61 -10 degrees    QTcB Int : 442 ms    Normal sinus rhythm  Nonspecific T wave abnormality  Abnormal ECG  No previous ECGs available  Confirmed by Jose Miranda ([id number])  on 3/11/2025 4:04:06 PM    Referred By: PARVEZ CARLIN           Confirmed By: Jose Miranda       2D ECHO:  TTE:  No results found for this or any previous visit.    NICK:  No results found for this or any previous visit.    ASSESSMENT/PLAN:         Pre-op Assessment    I have reviewed the Patient Summary Reports.     I have reviewed the Nursing Notes. I have reviewed the NPO Status.   I have reviewed the Medications.     Review of Systems  Anesthesia Hx:  No problems with previous Anesthesia   History of prior surgery of interest to airway management or planning:          Denies Family Hx of Anesthesia complications.    Denies Personal Hx of Anesthesia complications.                    Social:  Non-Smoker, No Alcohol Use       Hematology/Oncology:                      Denies Current/Recent Cancer                EENT/Dental:  denies chronic allergic rhinitis           Cardiovascular:     Hypertension   Denies MI.  Denies CAD.    Denies CABG/stent.  Denies Dysrhythmias.   Denies Angina.     no hyperlipidemia CODY    Patient not on beta blockers                          Pulmonary:    Denies COPD.  Denies Asthma.     Denies Sleep Apnea.                Renal/:  Chronic Renal Disease (Creat 0.9), CKD                Hepatic/GI:      Denies GERD. Denies Liver Disease.   Taking GLP-1 Agonists (hasn't taken it for months) Instructed to Hold for 7 Days           Musculoskeletal:  Denies Arthritis.               Neurological:    Denies CVA. Neuromuscular Disease, (bilateral UE, weakness of RUE)   Denies Seizures.    Patient endorses R>L UE numbness, tingling, weakness      Chronic Pain Syndrome                         Endocrine:  Diabetes, type 2         Obesity / BMI > 30  Psych:  Denies Psychiatric History.                  Physical Exam  General: Well nourished, Cooperative, Alert and Oriented    Airway:  Mallampati: II   Mouth Opening: Normal  TM Distance: Normal  Tongue: Normal  Neck ROM: Extension Decreased, Left Lateral  Motion Decreased, Extension Painful    Dental:  Intact        Anesthesia Plan  Type of Anesthesia, risks & benefits discussed:    Anesthesia Type: Gen ETT  Intra-op Monitoring Plan: Standard ASA Monitors and Art Line  Post Op Pain Control Plan: multimodal analgesia  Induction:  IV  Airway Plan: Direct and Video, Post-Induction  Informed Consent: Informed consent signed with the Patient and all parties understand the risks and agree with anesthesia plan.  All questions answered.   ASA Score: 2  Day of Surgery Review of History & Physical: H&P Update referred to the surgeon/provider.  Anesthesia Plan Notes: Chart reviewed, patient interviewed and examined.  The anesthetic plan was explained.  GETA, TIVA, 2 PIV, arterial line. Risks, benefits, and alternatives were discussed. Questions were answered and the consent was signed.      Tabby Manzo MD     Ready For Surgery From Anesthesia Perspective.     .           [1]   Patient Active Problem List  Diagnosis    Right knee pain    HTN (hypertension)    Type II or unspecified type diabetes mellitus without mention of complication, uncontrolled    Radiculopathy   [2]   No current facility-administered medications on file prior to encounter.     Current Outpatient Medications on File Prior to Encounter   Medication Sig Dispense Refill    aluminum & magnesium hydroxide-simethicone (MAALOX MAXIMUM STRENGTH) 400-400-40 mg/5 mL suspension Take 15 mLs by mouth every 6 (six) hours as needed for Indigestion.  0    aspirin 81 MG Chew Take 81 mg by mouth once daily.      atorvastatin (LIPITOR) 10 MG tablet Take 1 tablet (10 mg total) by mouth once daily. 90 tablet 3    erythromycin (ROMYCIN) ophthalmic ointment Place a 1/2 inch ribbon of ointment into the lower eyelid TID for 7 days 1 Tube 0    famotidine (PEPCID) 20 MG tablet Take 1 tablet (20 mg total) by mouth 2 (two) times daily. 20 tablet 0    glipiZIDE (GLUCOTROL) 10 MG tablet Take 10 mg by mouth 2 (two) times daily with  meals.      GLUCOSAM/MSM/CHONDR/VIT C/HYAL (GLUCOSAMINE-CHONDROITIN-MSM ORAL) Take 1 tablet by mouth 2 (two) times daily.      lisinopril (PRINIVIL,ZESTRIL) 20 MG tablet Take 20 mg by mouth once daily.      metformin (GLUCOPHAGE) 1000 MG tablet Take 1 tablet (1,000 mg total) by mouth 2 (two) times daily with meals. 180 tablet 3    ondansetron (ZOFRAN-ODT) 4 MG TbDL Take 1 tablet (4 mg total) by mouth every 8 (eight) hours as needed (nausea/vomiting). 12 tablet 0    semaglutide (OZEMPIC) 0.25 mg or 0.5 mg (2 mg/3 mL) pen injector Inject 0.5 mg into the skin every 7 days. 6 mL 1   [3]   Social History  Socioeconomic History    Marital status:    Occupational History     Employer: [employer]   Tobacco Use    Smoking status: Never    Smokeless tobacco: Never   Substance and Sexual Activity    Alcohol use: No    Drug use: No    Sexual activity: Yes     Partners: Female

## 2025-03-12 NOTE — OP NOTE
DATE OF SURGERY: 3/12/2025          PREOPERATIVE DIAGNOSIS:  1. Cervical radiculopathy  2. Multilevel severe central stenosis C4/5, 5/6, with severe NF stenosis C6/7     POSTOPERATIVE DIAGNOSIS:  1. As above     PROCEDURE PERFORMED:  1. Anterior cervical discectomy and fusion C4-7  2. Application of PEEK interbody cage Zavation C4-7  3. Application of plate C4-7 Zavation  4. Use of intraoperative microscope for microdissection  5. Use of neuromonitoring with MEPs  6. Use of intraoperative fluoroscopy  7. Synthetic bone grafting     SURGEON: Boogie Long DO     ASSISTANT: Ernestina Pacheco MD     ANESTHESIA: GETA     ESTIMATED BLOOD LOSS: 25mL     COMPLICATIONS: None     DRAINS: HV     SPECIMENS SENT: None     INDICATIONS:     59 M presented to clinic with long standing neck pain and cervical radiculopathy.  Imaging showed multilevel severe central stenosis from C4-6 and severe NF stenosis at C6/7.  He had failed conservative mgmt.  He was offered a C4-7 ACDF in order to decompress his neural elements and to stabilize the spine following.     Risks, benefits, alternatives, indications and methods were reviewed in detail and the patient wished to proceed. All questions were answered. No guarantees about the results of the procedure were made.     PROCEDURE:     The patient was brought into the operating room where he was intubated and placed under general anesthesia without difficulty. All lines were placed. The patient was positioned supine onto the operating table with appropriate padding of all pressure points. The head and neck were placed in slight extension, resting on foam donut. Lateral x-rays were taken for localization and to assess cervical lordosis. Baseline MEPs were run and found to be present and stable in all extremities. The right neck was marked, prepped and draped in the usual sterile fashion. A timeout was performed prior to the procedure. Ten mL of Lidocaine with epinephrine were injected  into the skin.     A transverse linear skin incision was made at the right neck and Weitlaner retractors were used to widen the incision. The platysma was divided sharply with Metzenbaum scissors. A sub-plastysmal dissection was carried out with Bovie electrocautery. A Cedillo Dean approach to the the anterior cervical spine was performed in the usual fashion. The carotid artery was palpated lateral to the working corridor. The prevertebral soft tissues were bluntly dissected with Kitner dissectors. A spinal needle was placed into the presumed C5/6 disc space, and was confirmed on lateral x-ray. Subperiosteal dissection was carried out at C4-7 vertebral bodies with Bovie electrocautery. The Trimline retractor system was put into place. Hubbell pins were placed at C5 and C7 and confirmed to be in good position on xray. They were then distracted to widen the disc space.     An annulotomy at C6/7 was performed with the 15 blade. Pituitary rongeurs and curettes were used to remove disc material. Anterior osteophytes were removed with the rongeur and Kerrison punches. The end plates were prepared with straight and angled curettes. The microscope was then brought into the field for microdissection. Posterior osteophytes were removed with the high speed drill and Kerrison punches bilaterally. The posterior longitudinal ligament was opened and resected with curettes and kerrison punches bilaterally. Adequate central and neuroforaminal decompression was achieved bilaterally and confirmed with a nerve hook.  Surgiflo was then placed for hemostasis.     An annulotomy at C5/6 was performed with the 15 blade. Pituitary rongeurs and curettes were used to remove disc material. Anterior osteophytes were removed with the rongeur and Kerrison punches. The end plates were prepared with straight and angled curettes. The microscope was then brought into the field for microdissection. Posterior osteophytes were removed with the high  speed drill and Kerrison punches bilaterally. The posterior longitudinal ligament was opened and resected with curettes and kerrison punches bilaterally. Adequate central and neuroforaminal decompression was achieved bilaterally and confirmed with a nerve hook.  Surgiflo was then placed for hemostasis.    The caspar pin was then removed from C7 and placed into C4 which was confirmed on xray.  The caspar pins were then distracted to widen the disc space at C4/5.    An annulotomy at C4/5 was performed with the 15 blade. Pituitary rongeurs and curettes were used to remove disc material. Anterior osteophytes were removed with the rongeur and Kerrison punches. The end plates were prepared with straight and angled curettes. The microscope was then brought into the field for microdissection. Posterior osteophytes were removed with the high speed drill and Kerrison punches bilaterally. The posterior longitudinal ligament was opened and resected with curettes and kerrison punches bilaterally. Adequate central and neuroforaminal decompression was achieved bilaterally and confirmed with a nerve hook.  Surgiflo was then placed for hemostasis.     Size 7 trials were used at all 3 levels from C4-7.  We then coutersunk our PEEK cages packed with synthetic bone into the disc spaces from C4-7 which had a snug fit.  Fluoro showed excellent placement of hardware.  Motors were consistent with baseline.  A titanium plate was then sized and secured to the vertebral bodies with size 18 variable screws at C4,7 and size 14 variable screws at C5,6.  The screws were finally tightened and locked.  Motors remained stable and fluoro showed excellent placement of hardware.     The wound was copiously irrigated with normal saline and hemostasis was achieved with bipolar electrocautery. Depomedrol was placed over the esophagus and the retropharyngeal space.  A HV drain was left in the prevertebral space and tunneled out of the wound where it was  secured with vicryl suture.  The platysma was reapproximated with interrupted inverted 3.0 Vicryl sutures and a subcuticular stitch was placed with 4.0 Monocryl. Dermabond was placed at the skin.     The patient tolerated the procedure well from a hemodynamic and neuromonitoring standpoint. MEPs were present and stable throughout the case. I was present for all critical portions of the case, and at the end of the case all counts were correct. The patient was repositioned supine onto the hospital bed where he was extubated and allowed to emerge from anesthesia without difficult. The patient was sent to the PACU in stable condition for recovery.

## 2025-03-13 LAB
ANION GAP SERPL CALC-SCNC: 10 MMOL/L (ref 8–16)
BASOPHILS # BLD AUTO: 0.01 K/UL (ref 0–0.2)
BASOPHILS NFR BLD: 0.1 % (ref 0–1.9)
BUN SERPL-MCNC: 21 MG/DL (ref 6–20)
CALCIUM SERPL-MCNC: 8.5 MG/DL (ref 8.7–10.5)
CHLORIDE SERPL-SCNC: 109 MMOL/L (ref 95–110)
CO2 SERPL-SCNC: 20 MMOL/L (ref 23–29)
CREAT SERPL-MCNC: 0.8 MG/DL (ref 0.5–1.4)
DIFFERENTIAL METHOD BLD: ABNORMAL
EOSINOPHIL # BLD AUTO: 0 K/UL (ref 0–0.5)
EOSINOPHIL NFR BLD: 0 % (ref 0–8)
ERYTHROCYTE [DISTWIDTH] IN BLOOD BY AUTOMATED COUNT: 13.7 % (ref 11.5–14.5)
EST. GFR  (NO RACE VARIABLE): >60 ML/MIN/1.73 M^2
GLUCOSE SERPL-MCNC: 154 MG/DL (ref 70–110)
HCT VFR BLD AUTO: 42.3 % (ref 40–54)
HGB BLD-MCNC: 13.6 G/DL (ref 14–18)
IMM GRANULOCYTES # BLD AUTO: 0.01 K/UL (ref 0–0.04)
IMM GRANULOCYTES NFR BLD AUTO: 0.1 % (ref 0–0.5)
LYMPHOCYTES # BLD AUTO: 0.4 K/UL (ref 1–4.8)
LYMPHOCYTES NFR BLD: 4.7 % (ref 18–48)
MCH RBC QN AUTO: 28.9 PG (ref 27–31)
MCHC RBC AUTO-ENTMCNC: 32.2 G/DL (ref 32–36)
MCV RBC AUTO: 90 FL (ref 82–98)
MONOCYTES # BLD AUTO: 0 K/UL (ref 0.3–1)
MONOCYTES NFR BLD: 0.4 % (ref 4–15)
NEUTROPHILS # BLD AUTO: 7.9 K/UL (ref 1.8–7.7)
NEUTROPHILS NFR BLD: 94.7 % (ref 38–73)
NRBC BLD-RTO: 0 /100 WBC
PLATELET # BLD AUTO: 113 K/UL (ref 150–450)
PMV BLD AUTO: 12 FL (ref 9.2–12.9)
POCT GLUCOSE: 175 MG/DL (ref 70–110)
POCT GLUCOSE: 203 MG/DL (ref 70–110)
POCT GLUCOSE: 205 MG/DL (ref 70–110)
POCT GLUCOSE: 253 MG/DL (ref 70–110)
POTASSIUM SERPL-SCNC: 4.1 MMOL/L (ref 3.5–5.1)
RBC # BLD AUTO: 4.71 M/UL (ref 4.6–6.2)
SODIUM SERPL-SCNC: 139 MMOL/L (ref 136–145)
WBC # BLD AUTO: 8.36 K/UL (ref 3.9–12.7)

## 2025-03-13 PROCEDURE — 11000001 HC ACUTE MED/SURG PRIVATE ROOM

## 2025-03-13 PROCEDURE — 97530 THERAPEUTIC ACTIVITIES: CPT

## 2025-03-13 PROCEDURE — 97161 PT EVAL LOW COMPLEX 20 MIN: CPT

## 2025-03-13 PROCEDURE — 63600175 PHARM REV CODE 636 W HCPCS

## 2025-03-13 PROCEDURE — 97116 GAIT TRAINING THERAPY: CPT

## 2025-03-13 PROCEDURE — 25000003 PHARM REV CODE 250: Performed by: STUDENT IN AN ORGANIZED HEALTH CARE EDUCATION/TRAINING PROGRAM

## 2025-03-13 PROCEDURE — 97535 SELF CARE MNGMENT TRAINING: CPT

## 2025-03-13 PROCEDURE — 51798 US URINE CAPACITY MEASURE: CPT

## 2025-03-13 PROCEDURE — 99024 POSTOP FOLLOW-UP VISIT: CPT | Mod: ,,,

## 2025-03-13 PROCEDURE — 63600175 PHARM REV CODE 636 W HCPCS: Performed by: STUDENT IN AN ORGANIZED HEALTH CARE EDUCATION/TRAINING PROGRAM

## 2025-03-13 PROCEDURE — 97166 OT EVAL MOD COMPLEX 45 MIN: CPT

## 2025-03-13 RX ORDER — ENOXAPARIN SODIUM 100 MG/ML
40 INJECTION SUBCUTANEOUS EVERY 24 HOURS
Status: DISCONTINUED | OUTPATIENT
Start: 2025-03-13 | End: 2025-03-14 | Stop reason: HOSPADM

## 2025-03-13 RX ADMIN — POLYETHYLENE GLYCOL 3350 17 G: 17 POWDER, FOR SOLUTION ORAL at 09:03

## 2025-03-13 RX ADMIN — INSULIN ASPART 4 UNITS: 100 INJECTION, SOLUTION INTRAVENOUS; SUBCUTANEOUS at 05:03

## 2025-03-13 RX ADMIN — LISINOPRIL 20 MG: 20 TABLET ORAL at 09:03

## 2025-03-13 RX ADMIN — SENNOSIDES AND DOCUSATE SODIUM 2 TABLET: 50; 8.6 TABLET ORAL at 09:03

## 2025-03-13 RX ADMIN — SENNOSIDES AND DOCUSATE SODIUM 2 TABLET: 50; 8.6 TABLET ORAL at 10:03

## 2025-03-13 RX ADMIN — ACETAMINOPHEN 650 MG: 325 TABLET ORAL at 05:03

## 2025-03-13 RX ADMIN — INSULIN ASPART 4 UNITS: 100 INJECTION, SOLUTION INTRAVENOUS; SUBCUTANEOUS at 12:03

## 2025-03-13 RX ADMIN — MUPIROCIN: 20 OINTMENT TOPICAL at 09:03

## 2025-03-13 RX ADMIN — ATORVASTATIN CALCIUM 10 MG: 10 TABLET, FILM COATED ORAL at 09:03

## 2025-03-13 RX ADMIN — ACETAMINOPHEN 650 MG: 325 TABLET ORAL at 12:03

## 2025-03-13 RX ADMIN — ENOXAPARIN SODIUM 40 MG: 40 INJECTION SUBCUTANEOUS at 05:03

## 2025-03-13 RX ADMIN — FAMOTIDINE 20 MG: 20 TABLET, FILM COATED ORAL at 10:03

## 2025-03-13 RX ADMIN — POLYETHYLENE GLYCOL 3350 17 G: 17 POWDER, FOR SOLUTION ORAL at 10:03

## 2025-03-13 RX ADMIN — INSULIN ASPART 2 UNITS: 100 INJECTION, SOLUTION INTRAVENOUS; SUBCUTANEOUS at 09:03

## 2025-03-13 RX ADMIN — INSULIN ASPART 3 UNITS: 100 INJECTION, SOLUTION INTRAVENOUS; SUBCUTANEOUS at 11:03

## 2025-03-13 RX ADMIN — CEFAZOLIN 2 G: 2 INJECTION, POWDER, FOR SOLUTION INTRAMUSCULAR; INTRAVENOUS at 06:03

## 2025-03-13 RX ADMIN — MUPIROCIN: 20 OINTMENT TOPICAL at 10:03

## 2025-03-13 RX ADMIN — OXYCODONE HYDROCHLORIDE 10 MG: 10 TABLET ORAL at 11:03

## 2025-03-13 RX ADMIN — CEFAZOLIN 2 G: 2 INJECTION, POWDER, FOR SOLUTION INTRAMUSCULAR; INTRAVENOUS at 10:03

## 2025-03-13 RX ADMIN — FAMOTIDINE 20 MG: 20 TABLET, FILM COATED ORAL at 09:03

## 2025-03-13 RX ADMIN — CEFAZOLIN 2 G: 2 INJECTION, POWDER, FOR SOLUTION INTRAMUSCULAR; INTRAVENOUS at 12:03

## 2025-03-13 RX ADMIN — ACETAMINOPHEN 650 MG: 325 TABLET ORAL at 11:03

## 2025-03-13 RX ADMIN — ACETAMINOPHEN 650 MG: 325 TABLET ORAL at 06:03

## 2025-03-13 RX ADMIN — OXYCODONE 5 MG: 5 TABLET ORAL at 05:03

## 2025-03-13 NOTE — NURSING
RN responde to bed alarm. Pt found walking to bathroom w/out neck brace on. Pt educated on importance of calling staff for mobility and applying neck brace when OOB. Neck brace applied and pt assisted rest of the way to the bathroom.

## 2025-03-13 NOTE — ASSESSMENT & PLAN NOTE
Jerson Isabel is a 59 y.o. male with neck pain and cervical radiculopathy s/p C4-7 ACDF with Dr. Long 3/12/25.    - q4h neurochecks.  - Post op XR without complication.  - HV drain to full suction. Record output qshift. IV Ancef while in place.  - C-collar when OOB.  - PT/OT.  - Bowel regimen.  - Remove brady, f/u voiding. Bladder scan q6 and straight cath PRN for volume >300 cc.  - Atelectasis ppx: IS hourly. Up in chair.  - DVT ppx: LVX.    D/w Dr. Long.    Dispo: Likely home tomorrow pending drain removal.

## 2025-03-13 NOTE — NURSING TRANSFER
Nursing Transfer Note      3/12/2025   9:06 PM    Nurse giving handoff:darian rn  Nurse receiving handoff:NPU RN Crystal    Reason patient is being transferred: recovery care complete    Transfer To: 925    Transfer via bed        Transported by RN/PCT      Additional Lines:     Medicines sent: NS @ 100 ML/HR    Any special needs or follow-up needed: routine    Patient belongings transferred with patient:  n/a    Chart send with patient: Yes    Notified: spouse    Patient reassessed at: 03/12/25 2030

## 2025-03-13 NOTE — PROGRESS NOTES
[facility] - Neurosurgery ([facility])  Neurosurgery  Progress Note    Subjective:     History of Present Illness: 59 M presented to clinic with long standing neck pain and cervical radiculopathy. Imaging showed multilevel severe central stenosis from C4-6 and severe NF stenosis at C6/7. He had failed conservative mgmt. He was offered a C4-7 ACDF in order to decompress his neural elements and to stabilize the spine following.     Post-Op Info:  Procedure(s) (LRB):  C4-7 ACDF (N/A)   1 Day Post-Op   Interval History: POD1. NAEON. AFVSS. Exam stable. Reports resolution of his radicular pain. Pain controlled. Tolerated his breakfast this AM. Ambulated well with PT. Drain with 65 cc output, will keep today. Post op XR without complication. Napoles in place, will remove today and f/u voiding.     Medications:  Continuous Infusions:   0.9% NaCl   Intravenous Continuous 10 mL/hr at 03/12/25 1211 New Bag at 03/12/25 1211     Scheduled Meds:   acetaminophen  650 mg Oral Q6H    atorvastatin  10 mg Oral Daily    bisacodyL  10 mg Rectal Daily    ceFAZolin (Ancef) IV (PEDS and ADULTS)  2 g Intravenous Q8H    enoxparin  40 mg Subcutaneous Q24H (prophylaxis, 1700)    famotidine  20 mg Oral BID    lisinopriL  20 mg Oral Daily    mupirocin   Nasal BID    polyethylene glycol  17 g Oral BID    senna-docusate 8.6-50 mg  2 tablet Oral BID     PRN Meds:  Current Facility-Administered Medications:     aluminum-magnesium hydroxide-simethicone, 30 mL, Oral, Q4H PRN    dextrose 50%, 12.5 g, Intravenous, PRN    dextrose 50%, 25 g, Intravenous, PRN    glucagon (human recombinant), 1 mg, Intramuscular, PRN    glucose, 16 g, Oral, PRN    glucose, 24 g, Oral, PRN    insulin aspart U-100, 0-10 Units, Subcutaneous, QID (AC + HS) PRN    methocarbamoL, 500 mg, Oral, TID PRN    morphine, 2 mg, Intravenous, Q4H PRN    ondansetron, 8 mg, Oral, Q6H PRN    oxyCODONE, 5 mg, Oral, Q4H PRN    oxyCODONE, 10 mg, Oral, Q4H PRN    prochlorperazine, 5 mg, Intravenous,  Q6H PRN     Review of Systems  Objective:     Weight: 85.8 kg (189 lb 2.5 oz)  Body mass index is 29.63 kg/m².  Vital Signs (Most Recent):  Temp: 98.2 °F (36.8 °C) (03/13/25 1216)  Pulse: 79 (03/13/25 1216)  Resp: 20 (03/13/25 0339)  BP: 127/76 (03/13/25 1216)  SpO2: (!) 94 % (03/13/25 1216) Vital Signs (24h Range):  Temp:  [97.5 °F (36.4 °C)-98.2 °F (36.8 °C)] 98.2 °F (36.8 °C)  Pulse:  [] 79  Resp:  [10-20] 20  SpO2:  [94 %-100 %] 94 %  BP: (127-148)/(68-92) 127/76     Date 03/13/25 0700 - 03/14/25 0659   Shift 3512-6334 8070-6222 9394-1971 24 Hour Total   INTAKE   Shift Total(mL/kg)       OUTPUT   Urine(mL/kg/hr) 1100   1100   Shift Total(mL/kg) 1100(12.8)   1100(12.8)   Weight (kg) 85.8 85.8 85.8 85.8                            Closed/Suction Drain 03/12/25 1710 Tube - 1 Medial Neck Accordion 10 Fr. (Active)   Site Description Healing 03/12/25 2052   Dressing Type Other (Comment) 03/12/25 1945   Dressing Status Clean;Dry;Intact 03/12/25 1945   Drainage Bloody 03/12/25 2132   Status To bulb suction 03/12/25 2132   Output (mL) 35 mL 03/13/25 0628            Neurosurgery Physical Exam  General: Well developed, well nourished, not in acute distress.   Head: Normocephalic, atraumatic.  Neck: Collar in place.  Neurologic: Alert and oriented x 4. Thought content appropriate.  GCS: Motor:6  Verbal:5  Eyes:4   GCS Total: 15  Language: No aphasia.  Speech: No dysarthria.  Cranial nerves: Face symmetric, tongue midline, CN II-XII grossly intact.   Eyes: Pupils equal, round, reactive to light with accomodation, EOMI. Unable to appreciate optic disc. Red reflex intact bilaterally.   Ears: No drainage. No erythema.  Pulmonary: Normal respirations, no signs of respiratory distress.  Abdomen: Soft, non-distended, non-tender to palpation.  Sensory: Intact to light touch throughout.  Motor Strength: Moves all extremities spontaneously with good tone. Full strength upper and lower extremities. No abnormal movements seen.      Strength  Deltoids Triceps Biceps Wrist Extension Wrist Flexion Hand    Upper: R 5/5 5/5 5/5 5/5 5/5 5/5    L 5/5 5/5 5/5 5/5 5/5 5/5     Hip Flexion Knee Extension Knee  Flexion Dorsiflexion Plantar flexion EHL   Lower: R 5/5 5/5 5/5 5/5 5/5 5/5    L 5/5 5/5 5/5 5/5 5/5 5/5   4+/5 b/l HI    DTRs: 2+ and symmetric in UEs and LEs.  Skin: Skin is warm, dry and intact.    Incision c/d/I with dermabond.     Significant Labs:  Recent Labs   Lab 03/12/25  1210 03/12/25  2334   * 154*    139   K 4.2 4.1    109   CO2 24 20*   BUN 23* 21*   CREATININE 0.8 0.8   CALCIUM 9.5 8.5*     Recent Labs   Lab 03/12/25  1210 03/12/25  2334   WBC 5.51 8.36   HGB 15.0 13.6*   HCT 46.2 42.3   * 113*     Recent Labs   Lab 03/12/25  1210   INR 1.0   APTT 26.6     Microbiology Results (last 7 days)       ** No results found for the last 168 hours. **          All pertinent labs from the last 24 hours have been reviewed.    Significant Diagnostics:  I have reviewed and interpreted all pertinent imaging results/findings within the past 24 hours.  Assessment/Plan:     * Cervical radiculopathy  Jerson Isabel is a 59 y.o. male with neck pain and cervical radiculopathy s/p C4-7 ACDF with Dr. Long 3/12/25.    - q4h neurochecks.  - Post op XR without complication.  - HV drain to full suction. Record output qshift. IV Ancef while in place.  - C-collar when OOB.  - PT/OT.  - Bowel regimen.  - Remove brady, f/u voiding. Bladder scan q6 and straight cath PRN for volume >300 cc.  - Atelectasis ppx: IS hourly. Up in chair.  - DVT ppx: LVX.    D/w Dr. Long.    Dispo: Likely home tomorrow pending drain removal.    Diabetes mellitus, type II  Patient's FSGs are controlled on current medication regimen.  Last A1c reviewed-   Lab Results   Component Value Date    HGBA1C 8.7 (H) 06/19/2015     Most recent fingerstick glucose reviewed-   Recent Labs   Lab 03/12/25  1800 03/12/25  2123 03/13/25  0758   POCTGLUCOSE 128* 148*  175*     Current correctional scale  Medium  Maintain anti-hyperglycemic dose as follows-   Antihyperglycemics (From admission, onward)      Start     Stop Route Frequency Ordered    03/12/25 1726  insulin aspart U-100 pen 0-10 Units         -- SubQ Before meals & nightly PRN 03/12/25 1726          Hold Oral hypoglycemics while patient is in the hospital.    HTN (hypertension)  Patient's blood pressure range in the last 24 hours was: BP  Min: 127/76  Max: 148/79.The patient's inpatient anti-hypertensive regimen is listed below:  Current Antihypertensives  lisinopriL tablet 20 mg, Daily, Oral    Plan  - BP is controlled, no changes needed to their regimen.        Celeste Bailey PA-C  Neurosurgery  [facility] ([facility])   Glycopyrrolate Counseling:  I discussed with the patient the risks of glycopyrrolate including but not limited to skin rash, drowsiness, dry mouth, difficulty urinating, and blurred vision.

## 2025-03-13 NOTE — PT/OT/SLP EVAL
"Physical Therapy Evaluation/co eval with OT    Patient Name:  Jerson Isabel   MRN:  [id number]    Recommendations:     Discharge Recommendations: No Therapy Indicated   Discharge Equipment Recommendations: grab bar   Barriers to discharge: None    Assessment:     Jerson Isabel is a 59 y.o. male admitted with a medical diagnosis of Cervical radiculopathy.  He presents with the following impairments/functional limitations: gait instability, pain, orthopedic precautions, impaired functional mobility . Pt requires SBA with verbal cues for proper technique for bed mobility, SBA for transfers, and CGA for gait due to mild instability and required verbal cues to keep his head in midline due to pt tends to laterally flex to the R.. Pt is motivated to progress with functional mobility.     Rehab Prognosis: Good; patient would benefit from acute skilled PT services to address these deficits and reach maximum level of function.    Recent Surgery: Procedure(s) (LRB):  C4-7 ACDF (N/A) 1 Day Post-Op    Plan:     During this hospitalization, patient to be seen 3 x/week to address the identified rehab impairments via gait training, therapeutic activities, neuromuscular re-education and progress toward the following goals:    Plan of Care Expires:  04/12/25    Subjective   "I am glad to get up"    Pain/Comfort:  Pain Rating 1: 6/10  Location - Orientation 1: posterior  Location 1: neck  Pain Addressed 1: Reposition, Cessation of Activity  Pain Rating Post-Intervention 1: 5/10    Patients cultural, spiritual, Zoroastrianism conflicts given the current situation: no    Living Environment:  Pt lives with his wife and adult children in a 2 story home with 1 [facility]. Pt lives on the 1st floor  Prior to admission, patients level of function was independent with ADLs and gait, works as a .  Equipment used at home: none.  Upon discharge, patient will have assistance from family.    Objective:     Communicated with nurse prior to session.  " Patient found HOB elevated with telemetry, cervical collar  upon PT entry to room.    General Precautions: Standard, fall  Orthopedic Precautions:spinal precautions   Braces: Cervical collar (c-collar when OOB)  Respiratory Status: Room air    Exams:  Cognitive Exam:  Patient is oriented to Person, Place, and Time  Sensation:    -       Intact  light/touch B LE  RLE ROM: WFL  RLE Strength: WFL  LLE ROM: WFL  LLE Strength: WFL except hip flex 4/5    Functional Mobility:  Bed Mobility:     Rolling Left:  stand by assistance  Supine to Sit: stand by assistance  Transfers:     Sit to Stand:  stand by assistance with no AD  Gait: 100ft then 10ft with no AD with CGA. Pt with mild instability noted. Pt tends to laterally flex his head to the R during gait. Pt given verbal and manual cues to find the midline. Pt and his son were educated in correcting to midline during gait, they expressed understanding.    AM-PAC 6 CLICK MOBILITY  Total Score:19     Treatment & Education:  Pt ambulated to the bathroom as above and performed ADls at the sink with mod independent. Pt educated in scapular retraction and shoulder roll exercises and performed them x 3 reps each. Pt and his son educated in spinal precautions with mobility, they expressed understanding.  Additional staff present: OT; OT for co-evaluation due to suspected patient need for two skilled therapists to appropriately assess patient's functional deficits as well as ensure patient safety, accommodate for limited activity tolerance, and provide appropriate, skilled assistance to maximize functional potential during evaluation.    Patient left up in chair with all lines intact, call button in reach, chair alarm on, nurse notified, and son present.    GOALS:   Multidisciplinary Problems       Physical Therapy Goals          Problem: Physical Therapy    Goal Priority Disciplines Outcome Interventions   Physical Therapy Goal     PT, PT/OT Progressing    Description: PT goals  until 3/20/25    1. Pt supine to sit with mod independent-not met  2. Pt sit to supine with mod independent-not met  3. Pt sit to stand with no AD with mod independent-not met  4. Pt to perform gait 200ft with no AD with supervision.-not met  5. Pt to go up/down curb step with no AD with supervision.-not met                         History:     Past Medical History:   Diagnosis Date    Chronic kidney disease     Diabetes mellitus, type 2     Hypertension        Past Surgical History:   Procedure Laterality Date    DECOMPRESSION OF CERVICAL SPINE BY ANTERIOR APPROACH WITH FUSION N/A 3/12/2025    Procedure: C4-7 ACDF;  Surgeon: Boogie Long DO;  Location: [facility] OR [address] [place] [address];  Service: Neurosurgery;  Laterality: N/A;       Time Tracking:     PT Received On: 03/13/25  PT Start Time: 0917     PT Stop Time: 0953  PT Total Time (min): 36 min     Billable Minutes: Evaluation 10, Gait Training 15, and Therapeutic Activity 11      03/13/2025

## 2025-03-13 NOTE — PT/OT/SLP EVAL
Occupational Therapy  Co- Evaluation and Discharge Note    Name: Jerson Isabel  MRN: [id number]  Admitting Diagnosis: Cervical radiculopathy  Recent Surgery: Procedure(s) (LRB):  C4-7 ACDF (N/A) 1 Day Post-Op    Recommendations:     Discharge Recommendations: No Therapy Indicated  Discharge Equipment Recommendations:  None   Barriers to discharge:  None    Assessment:     Jerson Isabel is a 59 y.o. male with a medical diagnosis of Cervical radiculopathy. At this time, patient is functioning at their prior level of function and does not require further acute OT services.     Plan:     During this hospitalization, patient does not require further acute OT services.  Please re-consult if situation changes.    Plan of Care Reviewed with: patient, family    Subjective     Chief Complaint: None   Patient/Family Comments/goals: DC     Occupational Profile:  Living Environment: Patient lives with family in 2 story house with bed/bath on 1st floor 1 [facility]; [facility]   Previous level of function: Independent   Roles and Routines: none   Equipment Used at home: none  Assistance upon Discharge: family     Pain/Comfort:  Pain Rating 1: 6/10  Location - Orientation 1: generalized  Location 1: back  Pain Addressed 1: Reposition, Distraction  Pain Rating Post-Intervention 1: 5/10    Patients cultural, spiritual, Orthodox conflicts given the current situation: no    Objective:     Communicated with: RN prior to session.  Patient found supine with telemetry upon OT entry to room.    General Precautions: Standard, fall  Orthopedic Precautions: spinal precautions  Braces: Cervical collar  Respiratory Status: Room air     Occupational Performance:    Bed Mobility:    Patient completed Scooting/Bridging with stand by assistance  Patient completed Supine to Sit with stand by assistance  Patient sat EOB with standby assistance; patient needing increased assistance for postural stability and trunk support.     Functional Mobility/Transfers:  Patient  completed Sit <> Stand Transfer with stand by assistance  with  no assistive device   Patient completed Toilet Transfer Step Transfer technique with contact guard assistance with  rolling walker  Patient completed  Shower Transfer Step Transfer technique with contact guard assistance with rolling walker  Functional Mobility: Patient ambulated functional household distance with RW and CGA     Activities of Daily Living:  Grooming: stand by assistance at sink to complete oral care and facial grooming     Cognitive/Visual Perceptual:  Cognitive/Psychosocial Skills:     -       Oriented to: Person, Place, Time, and Situation   -       Follows Commands/attention:Follows multistep  commands  -       Communication: clear/fluent  -       Safety awareness/insight to disability: intact   Visual/Perceptual:      -Intact      Physical Exam:  Sensation:    -       Intact  Upper Extremity Range of Motion:     -       Right Upper Extremity: WFL  -       Left Upper Extremity: WFL  Upper Extremity Strength:    -       Right Upper Extremity: WFL  -       Left Upper Extremity: WFL   Strength:    -       Right Upper Extremity: WFL  -       Left Upper Extremity: WFL  Fine Motor Coordination:    -       Intact    AMPAC 6 Click ADL:  AMPAC Total Score: 24    Treatment & Education:  Co-evaluation completed due to patient medical instability and to ensure patient safety. Provided education regarding role of OT, POC, & discharge recommendations.  Pt with no further questions/concerns at this time. OT provided education on home recommendations and fall prevention in preparation for D/C.     Patient left supine with all lines intact and call button in reach    GOALS:   Multidisciplinary Problems       Occupational Therapy Goals       Not on file              Multidisciplinary Problems (Resolved)          Problem: Occupational Therapy    Goal Priority Disciplines Outcome Interventions   Occupational Therapy Goal   (Resolved)     OT, PT/OT  Met    Description: Goals to be met by: 4/13/25     Patient will increase functional independence with ADLs by performing:    Step transfer with Maximum Assistance                       History:     Past Medical History:   Diagnosis Date    Chronic kidney disease     Diabetes mellitus, type 2     Hypertension          Past Surgical History:   Procedure Laterality Date    DECOMPRESSION OF CERVICAL SPINE BY ANTERIOR APPROACH WITH FUSION N/A 3/12/2025    Procedure: C4-7 ACDF;  Surgeon: Boogie Long DO;  Location: [facility] OR [address] [place] [address];  Service: Neurosurgery;  Laterality: N/A;       Time Tracking:     OT Date of Treatment: 03/13/25  OT Start Time: 0918  OT Stop Time: 0953  OT Total Time (min): 35 min    Billable Minutes:Evaluation 10  Self Care/Home Management 10  Therapeutic Activity 15    3/13/2025

## 2025-03-13 NOTE — SUBJECTIVE & OBJECTIVE
Interval History: POD1. NAEON. AFVSS. Exam stable. Reports resolution of his radicular pain. Pain controlled. Tolerated his breakfast this AM. Ambulated well with PT. Drain with 65 cc output, will keep today. Post op XR without complication. Napoles in place, will remove today and f/u voiding.     Medications:  Continuous Infusions:   0.9% NaCl   Intravenous Continuous 10 mL/hr at 03/12/25 1211 New Bag at 03/12/25 1211     Scheduled Meds:   acetaminophen  650 mg Oral Q6H    atorvastatin  10 mg Oral Daily    bisacodyL  10 mg Rectal Daily    ceFAZolin (Ancef) IV (PEDS and ADULTS)  2 g Intravenous Q8H    enoxparin  40 mg Subcutaneous Q24H (prophylaxis, 1700)    famotidine  20 mg Oral BID    lisinopriL  20 mg Oral Daily    mupirocin   Nasal BID    polyethylene glycol  17 g Oral BID    senna-docusate 8.6-50 mg  2 tablet Oral BID     PRN Meds:  Current Facility-Administered Medications:     aluminum-magnesium hydroxide-simethicone, 30 mL, Oral, Q4H PRN    dextrose 50%, 12.5 g, Intravenous, PRN    dextrose 50%, 25 g, Intravenous, PRN    glucagon (human recombinant), 1 mg, Intramuscular, PRN    glucose, 16 g, Oral, PRN    glucose, 24 g, Oral, PRN    insulin aspart U-100, 0-10 Units, Subcutaneous, QID (AC + HS) PRN    methocarbamoL, 500 mg, Oral, TID PRN    morphine, 2 mg, Intravenous, Q4H PRN    ondansetron, 8 mg, Oral, Q6H PRN    oxyCODONE, 5 mg, Oral, Q4H PRN    oxyCODONE, 10 mg, Oral, Q4H PRN    prochlorperazine, 5 mg, Intravenous, Q6H PRN     Review of Systems  Objective:     Weight: 85.8 kg (189 lb 2.5 oz)  Body mass index is 29.63 kg/m².  Vital Signs (Most Recent):  Temp: 98.2 °F (36.8 °C) (03/13/25 1216)  Pulse: 79 (03/13/25 1216)  Resp: 20 (03/13/25 0339)  BP: 127/76 (03/13/25 1216)  SpO2: (!) 94 % (03/13/25 1216) Vital Signs (24h Range):  Temp:  [97.5 °F (36.4 °C)-98.2 °F (36.8 °C)] 98.2 °F (36.8 °C)  Pulse:  [] 79  Resp:  [10-20] 20  SpO2:  [94 %-100 %] 94 %  BP: (127-148)/(68-92) 127/76     Date 03/13/25 0700  - 03/14/25 0659   Shift 5702-8083 6255-1561 9081-9212 24 Hour Total   INTAKE   Shift Total(mL/kg)       OUTPUT   Urine(mL/kg/hr) 1100   1100   Shift Total(mL/kg) 1100(12.8)   1100(12.8)   Weight (kg) 85.8 85.8 85.8 85.8                            Closed/Suction Drain 03/12/25 1710 Tube - 1 Medial Neck Accordion 10 Fr. (Active)   Site Description Healing 03/12/25 2052   Dressing Type Other (Comment) 03/12/25 1945   Dressing Status Clean;Dry;Intact 03/12/25 1945   Drainage Bloody 03/12/25 2132   Status To bulb suction 03/12/25 2132   Output (mL) 35 mL 03/13/25 0628            Neurosurgery Physical Exam  General: Well developed, well nourished, not in acute distress.   Head: Normocephalic, atraumatic.  Neck: Collar in place.  Neurologic: Alert and oriented x 4. Thought content appropriate.  GCS: Motor:6  Verbal:5  Eyes:4   GCS Total: 15  Language: No aphasia.  Speech: No dysarthria.  Cranial nerves: Face symmetric, tongue midline, CN II-XII grossly intact.   Eyes: Pupils equal, round, reactive to light with accomodation, EOMI. Unable to appreciate optic disc. Red reflex intact bilaterally.   Ears: No drainage. No erythema.  Pulmonary: Normal respirations, no signs of respiratory distress.  Abdomen: Soft, non-distended, non-tender to palpation.  Sensory: Intact to light touch throughout.  Motor Strength: Moves all extremities spontaneously with good tone. Full strength upper and lower extremities. No abnormal movements seen.     Strength  Deltoids Triceps Biceps Wrist Extension Wrist Flexion Hand    Upper: R 5/5 5/5 5/5 5/5 5/5 5/5    L 5/5 5/5 5/5 5/5 5/5 5/5     Hip Flexion Knee Extension Knee  Flexion Dorsiflexion Plantar flexion EHL   Lower: R 5/5 5/5 5/5 5/5 5/5 5/5    L 5/5 5/5 5/5 5/5 5/5 5/5   4+/5 b/l HI    DTRs: 2+ and symmetric in UEs and LEs.  Skin: Skin is warm, dry and intact.    Incision c/d/I with dermabond.     Significant Labs:  Recent Labs   Lab 03/12/25  1210 03/12/25  2334   * 154*   NA  138 139   K 4.2 4.1    109   CO2 24 20*   BUN 23* 21*   CREATININE 0.8 0.8   CALCIUM 9.5 8.5*     Recent Labs   Lab 03/12/25  1210 03/12/25  2334   WBC 5.51 8.36   HGB 15.0 13.6*   HCT 46.2 42.3   * 113*     Recent Labs   Lab 03/12/25  1210   INR 1.0   APTT 26.6     Microbiology Results (last 7 days)       ** No results found for the last 168 hours. **          All pertinent labs from the last 24 hours have been reviewed.    Significant Diagnostics:  I have reviewed and interpreted all pertinent imaging results/findings within the past 24 hours.

## 2025-03-13 NOTE — PLAN OF CARE
POC updated and reviewed with the patient, wife and son at the bedside. Questions regarding POC were encouraged and addressed. VSS, see flowsheets. Patient is AOX 4 at this time. No acute events noted during shift. Fall, and safety precautions maintained, no signs of injury noted during shift. Patient repositioned independently and with assistance in bed for comfort. Pt ambulated w/ PT in wheeler, [place] brace on when OOB. Upon exiting room, patient's bed locked in low position, side rails up x 2, bed alarm on, with call light within reach. Instructed patient to call staff for mobility, verbalized understanding. No acute signs of distress noted at this time.     -Radha Baeza    Problem: Adult Inpatient Plan of Care  Goal: Plan of Care Review  Outcome: Progressing  Goal: Optimal Comfort and Wellbeing  Outcome: Progressing  Goal: Readiness for Transition of Care  Outcome: Progressing     Problem: Diabetes Comorbidity  Goal: Blood Glucose Level Within Targeted Range  Outcome: Progressing     Problem: Infection  Goal: Absence of Infection Signs and Symptoms  Outcome: Progressing

## 2025-03-13 NOTE — ASSESSMENT & PLAN NOTE
Patient's blood pressure range in the last 24 hours was: BP  Min: 127/76  Max: 148/79.The patient's inpatient anti-hypertensive regimen is listed below:  Current Antihypertensives  lisinopriL tablet 20 mg, Daily, Oral    Plan  - BP is controlled, no changes needed to their regimen.

## 2025-03-13 NOTE — HOSPITAL COURSE
03/13/2025 POD1. TERESA. AFVSS. Exam stable. Reports resolution of his radicular pain. Pain controlled. Tolerated his breakfast this AM. Ambulated well with PT. Drain with 65 cc output, will keep today. Post op XR without complication. Napoles in place, will remove today and f/u voiding.

## 2025-03-13 NOTE — PLAN OF CARE
Seen on arrival to NPU just before 9 PM, patient at this point alert and conversant, 4+ HG otherwise full strength throughout. Currently denies any radicular pain.     Dr. Long updated    Cathy Bell, FERNANDO PGY3

## 2025-03-13 NOTE — HPI
59 M presented to clinic with long standing neck pain and cervical radiculopathy. Imaging showed multilevel severe central stenosis from C4-6 and severe NF stenosis at C6/7. He had failed conservative mgmt. He was offered a C4-7 ACDF in order to decompress his neural elements and to stabilize the spine following.

## 2025-03-13 NOTE — PLAN OF CARE
Problem: Physical Therapy  Goal: Physical Therapy Goal  Description: PT goals until 3/20/25    1. Pt supine to sit with mod independent-not met  2. Pt sit to supine with mod independent-not met  3. Pt sit to stand with no AD with mod independent-not met  4. Pt to perform gait 200ft with no AD with supervision.-not met  5. Pt to go up/down curb step with no AD with supervision.-not met  Outcome: Progressing   Pt's goals set and pt will benefit from skilled PT services to work towards improved functional mobility including: bed mobility, transfers, up/down step, and gait. Stephenie Aguilar PT   3/13/2025

## 2025-03-13 NOTE — NURSING
Patient Transferred to NPU Room 925, approx 852 pm from PACU. Upon arrival to the floor, patient greeted and oriented to room. Complete head to toe assessment completed per protocol. VSS, see flowsheet for details. Neuro assessment completed. Primary team notified of patient's transfer to floor. All current and transfer orders reviewed/reconciled per protocol. All emergency equipment set up in patient's room. Fall precautions initiated per providers orders. 4 Eyes skin assessment performed, see below for details. Reviewed assessment and rounding frequency with patient and family. Questions were encouraged and addressed. Repositioned patient for comfort with bed locked in lowest position, side rails up x 3, bed alarm set, and call light within reach. Instructed patient to call staff for mobility/assistance, verbalized understanding. No acute signs of distress noted at this time. Patient S/P cervical surgery to C4-C7.  HV drain full suction to medial neck noted on floor arrival, see intake/output flowsheets for details. Brady in place and to be removed POD 1 at 0600, brady care completed. Orders for Aspen brace to be worn OOB at all times. Pain regimen reviewed with patient. Questions were encourage and addressed. Mobility protocol reviewed with patient, verbalized understanding.

## 2025-03-13 NOTE — ASSESSMENT & PLAN NOTE
Patient's FSGs are controlled on current medication regimen.  Last A1c reviewed-   Lab Results   Component Value Date    HGBA1C 8.7 (H) 06/19/2015     Most recent fingerstick glucose reviewed-   Recent Labs   Lab 03/12/25  1800 03/12/25 2123 03/13/25  0758   POCTGLUCOSE 128* 148* 175*     Current correctional scale  Medium  Maintain anti-hyperglycemic dose as follows-   Antihyperglycemics (From admission, onward)      Start     Stop Route Frequency Ordered    03/12/25 1726  insulin aspart U-100 pen 0-10 Units         -- SubQ Before meals & nightly PRN 03/12/25 1726          Hold Oral hypoglycemics while patient is in the hospital.

## 2025-03-13 NOTE — PLAN OF CARE
Problem: Occupational Therapy  Goal: Occupational Therapy Goal  Description: Goals to be met by: 4/13/25     Patient will increase functional independence with ADLs by performing:    Step transfer with Maximum Assistance    Outcome: Met

## 2025-03-13 NOTE — ANESTHESIA POSTPROCEDURE EVALUATION
Anesthesia Post Evaluation    Patient: Jerson Isabel    Procedure(s) Performed: Procedure(s) (LRB):  C4-7 ACDF (N/A)    Final Anesthesia Type: general      Patient location during evaluation: PACU  Patient participation: Yes- Able to Participate  Level of consciousness: awake and alert  Post-procedure vital signs: reviewed and stable  Pain management: adequate  Airway patency: patent  EDWARDO mitigation strategies: Extubation and recovery carried out in lateral, semiupright, or other nonsupine position  PONV status at discharge: No PONV  Anesthetic complications: no      Cardiovascular status: blood pressure returned to baseline  Respiratory status: room air  Hydration status: euvolemic  Follow-up not needed.              Vitals Value Taken Time   /92 03/13/25 07:59   Temp 36.7 °C (98 °F) 03/13/25 07:59   Pulse 88 03/13/25 07:59   Resp 20 03/13/25 03:39   SpO2 97 % 03/13/25 07:59         Event Time   Out of Recovery 18:45:00         Pain/Sharon Score: Pain Rating Prior to Med Admin: 9 (3/13/2025  6:19 AM)  Sharon Score: 9 (3/12/2025  6:45 PM)

## 2025-03-14 VITALS
TEMPERATURE: 98 F | DIASTOLIC BLOOD PRESSURE: 86 MMHG | OXYGEN SATURATION: 96 % | WEIGHT: 189.13 LBS | RESPIRATION RATE: 17 BRPM | SYSTOLIC BLOOD PRESSURE: 144 MMHG | HEIGHT: 67 IN | HEART RATE: 71 BPM | BODY MASS INDEX: 29.69 KG/M2

## 2025-03-14 PROBLEM — R52 PAIN: Status: ACTIVE | Noted: 2025-03-14

## 2025-03-14 PROBLEM — G95.9 CERVICAL MYELOPATHY: Status: ACTIVE | Noted: 2025-03-14

## 2025-03-14 PROBLEM — E78.5 HLD (HYPERLIPIDEMIA): Status: ACTIVE | Noted: 2025-03-14

## 2025-03-14 LAB
POCT GLUCOSE: 173 MG/DL (ref 70–110)
POCT GLUCOSE: 244 MG/DL (ref 70–110)

## 2025-03-14 PROCEDURE — 63600175 PHARM REV CODE 636 W HCPCS: Performed by: STUDENT IN AN ORGANIZED HEALTH CARE EDUCATION/TRAINING PROGRAM

## 2025-03-14 PROCEDURE — 99024 POSTOP FOLLOW-UP VISIT: CPT | Mod: ,,,

## 2025-03-14 PROCEDURE — 25000003 PHARM REV CODE 250: Performed by: STUDENT IN AN ORGANIZED HEALTH CARE EDUCATION/TRAINING PROGRAM

## 2025-03-14 PROCEDURE — 97116 GAIT TRAINING THERAPY: CPT

## 2025-03-14 RX ORDER — METHOCARBAMOL 500 MG/1
500 TABLET, FILM COATED ORAL 3 TIMES DAILY PRN
Qty: 15 TABLET | Refills: 0 | Status: SHIPPED | OUTPATIENT
Start: 2025-03-14 | End: 2025-03-14

## 2025-03-14 RX ORDER — METHOCARBAMOL 500 MG/1
500 TABLET, FILM COATED ORAL 3 TIMES DAILY PRN
Qty: 30 TABLET | Refills: 0 | Status: SHIPPED | OUTPATIENT
Start: 2025-03-14 | End: 2025-03-24

## 2025-03-14 RX ORDER — OXYCODONE AND ACETAMINOPHEN 5; 325 MG/1; MG/1
1 TABLET ORAL EVERY 6 HOURS PRN
Qty: 28 TABLET | Refills: 0 | Status: SHIPPED | OUTPATIENT
Start: 2025-03-14

## 2025-03-14 RX ADMIN — INSULIN ASPART 2 UNITS: 100 INJECTION, SOLUTION INTRAVENOUS; SUBCUTANEOUS at 07:03

## 2025-03-14 RX ADMIN — CEFAZOLIN 2 G: 2 INJECTION, POWDER, FOR SOLUTION INTRAMUSCULAR; INTRAVENOUS at 06:03

## 2025-03-14 RX ADMIN — MUPIROCIN: 20 OINTMENT TOPICAL at 09:03

## 2025-03-14 RX ADMIN — FAMOTIDINE 20 MG: 20 TABLET, FILM COATED ORAL at 09:03

## 2025-03-14 RX ADMIN — ACETAMINOPHEN 650 MG: 325 TABLET ORAL at 06:03

## 2025-03-14 RX ADMIN — INSULIN ASPART 4 UNITS: 100 INJECTION, SOLUTION INTRAVENOUS; SUBCUTANEOUS at 11:03

## 2025-03-14 RX ADMIN — OXYCODONE HYDROCHLORIDE 10 MG: 10 TABLET ORAL at 09:03

## 2025-03-14 RX ADMIN — SENNOSIDES AND DOCUSATE SODIUM 2 TABLET: 50; 8.6 TABLET ORAL at 09:03

## 2025-03-14 RX ADMIN — ACETAMINOPHEN 650 MG: 325 TABLET ORAL at 11:03

## 2025-03-14 RX ADMIN — ATORVASTATIN CALCIUM 10 MG: 10 TABLET, FILM COATED ORAL at 09:03

## 2025-03-14 RX ADMIN — LISINOPRIL 20 MG: 20 TABLET ORAL at 09:03

## 2025-03-14 RX ADMIN — POLYETHYLENE GLYCOL 3350 17 G: 17 POWDER, FOR SOLUTION ORAL at 09:03

## 2025-03-14 NOTE — PLAN OF CARE
Problem: Adult Inpatient Plan of Care  Goal: Plan of Care Review  Outcome: Progressing  Goal: Patient-Specific Goal (Individualized)  Outcome: Progressing  Goal: Absence of Hospital-Acquired Illness or Injury  Outcome: Progressing  Goal: Optimal Comfort and Wellbeing  Outcome: Progressing  Goal: Readiness for Transition of Care  Outcome: Progressing     Problem: Diabetes Comorbidity  Goal: Blood Glucose Level Within Targeted Range  Outcome: Progressing     Problem: Infection  Goal: Absence of Infection Signs and Symptoms  Outcome: Progressing    POC reviewed and updated with the patient at the bedside. Questions regarding POC were encouraged and addressed. VSS, see flowsheets. NAEON. Patient is AOx 4 at this time. Fall and safety precautions maintained, no signs of injury noted during shift.  Hemovac drain to full suction in place, see flowsheets for output. Pain management utilizing PRN pain medications, see MAR for administration details. Aspen brace to be worn OOB at all times. Upon exiting room, patient's bed locked in low position, side rails up x 2, bed alarm set, with call light within reach. Instructed patient to call staff for mobility, verbalized understanding.  No acute signs of distress noted at this time.

## 2025-03-14 NOTE — CARE UPDATE
I have reviewed the chart of Jerson Isabel who is hospitalized for the following:    Active Hospital Problems    Diagnosis    *Cervical radiculopathy    Cervical myelopathy    HLD (hyperlipidemia)    Pain    Diabetes mellitus, type II    HTN (hypertension)        Mary Lainez NP  Unit Based [facility]

## 2025-03-14 NOTE — DISCHARGE INSTRUCTIONS
Post op Patient Instructions     Activity Restrictions:  - Return to work/school will be determined on an individual basis.  - No lifting greater than 5-10 pounds.  - Avoid bending and twisting the area of your surgery more than 45 degrees from neutral position in any direction.  - No driving or operating machinery: until cleared by your surgeon or while taking narcotic pain medications or muscle relaxants.  - Increase ambulation over the next 2 weeks so that you are walking 2 miles per day at 2 weeks post-operatively.  - Walk on paved surfaces only. It is okay to walk up and down stairs while holding onto a side rail.  - No sexual activity for 6 weeks.  - Wear your c-collar until cleared by your Neurosurgeon. You may remove it when laying in bed.    Discharge Medication:  - Please refer to discharge medication reconciliation form.  - Do not take ANY Aspirin or Aspirin containing products for 2 weeks after surgery.   - Do not take ANY herbal supplements for 2 weeks after surgery.    - Do not take ANY non-steroidal anti-inflammatory drugs (NSAIDS), including the following: Ibuprofen, Naproxen, Aleve, Advil, Indocin, Mobic, or Celebrex for 12 weeks after surgery.      Follow-up appointments:  - In 10-14 days post-op for wound check by nurse.  - In 4-6 weeks with MD/PA.     Appointments will be arranged for you and you will be contacted with a date and time.     Wound Care:  - No bandage required. Keep your incision open to the air.   - You may shower after your surgery. Keep the incision clean and dry as much as possible. Do not allow the force of water to hit the incision. If the incision gets damp, pat it dry. Do not rub or scrub the incision.  - You cannot take a bath until 8 weeks after surgery.     Call your doctor or go to the Emergency Room for any signs of infection, including: increased redness, drainage, pain, or fever (temperature >=101.5 for 24 hours). Call your doctor or go to the Emergency Room if there  are any localized neurological changes; problems with speech, vision, numbness, tingling, weakness, or severe headache; inability to control urination or bowel movements; inability to urinate; or for other concerns.     Special Instructions:  - No use of tobacco products.  - Diet: Please eat your regular diet as tolerated.        Physicians need 3 days notice for pain medicine to be refilled. Pain medicine will only be refilled between 8 AM and 5 PM, Monday through Friday, due to Food and Drug Administration regulation of documentation.     If you have any questions about this form, please call [phone].     Form No. 07082 (Revised 10/31/2013)

## 2025-03-14 NOTE — PLAN OF CARE
Problem: Physical Therapy  Goal: Physical Therapy Goal  Description: PT goals until 3/20/25    1. Pt supine to sit with mod independent- met 3/14/25  2. Pt sit to supine with mod independent-met 3/14/25  3. Pt sit to stand with no AD with mod independent-not met  4. Pt to perform gait 200ft with no AD with supervision.- met 3/14/25  5. Pt to go up/down curb step with no AD with supervision.- met 3/14/25  Outcome: Progressing   PT D/Trevor due to pt able to perform functional mobility including up/down steps.  Stephenie Aguilar PT  3/14/2025

## 2025-03-14 NOTE — PLAN OF CARE
[facility] - Neurosurgery (Hospital)  Discharge Final Note    Primary Care Provider: Ramsey Tran MD    Expected Discharge Date: 3/14/2025    Patient to be discharged home. The patient does not have any home needs.  Family to provide transportation home.  Neurosurgery clinic to schedule follow up appointment.    Future Appointments   Date Time Provider Department Center   3/26/2025 11:45 AM Danette Frost RN [facility] NEUROS[facility]   4/16/2025 10:30 AM [facility] OIC-XRAY N[facility] XRAY [facility]   4/16/2025 11:30 AM Brit Fernandez NP [facility] NEUROS8 [facility]   5/27/2025  3:00 PM Boogie Long DO [facility] NEUR[facility]8 [facility]        Final Discharge Note (most recent)       Final Note - 03/14/25 1125          Final Note    Assessment Type Final Discharge Note     Anticipated Discharge Disposition Home or Self Care        Post-Acute Status    Post-Acute Authorization Other     Other Status No Post-Acute Service Needs     Discharge Delays None known at this time                     Important Message from Medicare

## 2025-03-14 NOTE — PLAN OF CARE
Problem: Adult Inpatient Plan of Care  Goal: Plan of Care Review  Outcome: Met  Goal: Patient-Specific Goal (Individualized)  Outcome: Met  Goal: Absence of Hospital-Acquired Illness or Injury  Outcome: Met  Goal: Optimal Comfort and Wellbeing  Outcome: Met  Goal: Readiness for Transition of Care  Outcome: Met     Problem: Diabetes Comorbidity  Goal: Blood Glucose Level Within Targeted Range  Outcome: Met     Problem: Infection  Goal: Absence of Infection Signs and Symptoms  Outcome: Met     Pt discharging to home via family members personal vehicle. PIV removed. AVS printed and reviewed with pt. Belongings sent with pt. VSS, RA. No questions or comments at this time.

## 2025-03-14 NOTE — PT/OT/SLP PROGRESS
"Physical Therapy Treatment/D/C Summary    Patient Name:  Jerson Isabel   MRN:  [id number]    Recommendations:     Discharge Recommendations: No Therapy Indicated  Discharge Equipment Recommendations: grab bar  Barriers to discharge: None    Assessment:     Jerson Isabel is a 59 y.o. male admitted with a medical diagnosis of Cervical radiculopathy.  He presents with the following impairments/functional limitations: pain . Pt and his wife educated in maintaining upright midline orientation of pt's head during mobility. They expressed understanding.    Rehab Prognosis: Good; patient would benefit from acute skilled PT services to address these deficits and reach maximum level of function.    Recent Surgery: Procedure(s) (LRB):  C4-7 ACDF (N/A) 2 Days Post-Op    Plan:     PT D/Trevor due to pt able to perform functional mobility including up/down steps    Plan of Care Expires:  04/12/25    Subjective   "I want to walk a little further if possible"    Pain/Comfort:  Pain Rating 1: 4/10  Location - Orientation 1: posterior  Location 1: neck  Pain Addressed 1: Reposition, Cessation of Activity  Pain Rating Post-Intervention 1: 4/10      Objective:     Communicated with nurse prior to session.  Patient found HOB elevated with cervical collar, telemetry upon PT entry to room.     General Precautions: Standard, fall  Orthopedic Precautions: spinal precautions  Braces: Cervical collar (when OOB)  Respiratory Status: Room air     Functional Mobility:  Bed Mobility:     Supine to Sit: modified independence  Sit to Supine: modified independence  Transfers:     Sit to Stand:  supervision with no AD  Gait: 300ft with no AD with supervision. Pt given verbal cues for maintaining head in a midline position in standing due to pt tends to laterally flex to the R.   Stairs:  Pt ascended/descended 4 stair(s) with No Assistive Device with bilateral handrails with Supervision or Set-up Assistance.     AM-PAC 6 CLICK MOBILITY  Turning over in bed " (including adjusting bedclothes, sheets and blankets)?: 4  Sitting down on and standing up from a chair with arms (e.g., wheelchair, bedside commode, etc.): 3  Moving from lying on back to sitting on the side of the bed?: 4  Moving to and from a bed to a chair (including a wheelchair)?: 4  Need to walk in hospital room?: 4  Climbing 3-5 steps with a railing?: 4  Basic Mobility Total Score: 23     Treatment & Education:  Pt and his wife were educated in safety with mobility upon D/C. They expressed understanding.    Patient left HOB elevated with all lines intact, call button in reach, nurse notified, and wife present..    GOALS:   Multidisciplinary Problems       Physical Therapy Goals          Problem: Physical Therapy    Goal Priority Disciplines Outcome Interventions   Physical Therapy Goal     PT, PT/OT Progressing    Description: PT goals until 3/20/25    1. Pt supine to sit with mod independent- met 3/14/25  2. Pt sit to supine with mod independent-met 3/14/25  3. Pt sit to stand with no AD with mod independent-not met  4. Pt to perform gait 200ft with no AD with supervision.- met 3/14/25  5. Pt to go up/down curb step with no AD with supervision.-not met                           DME Justifications:  No DME recommended requiring DME justifications    Time Tracking:     PT Received On: 03/14/25  PT Start Time: 1128     PT Stop Time: 1144  PT Total Time (min): 16 min     Billable Minutes: Gait Training 16    Treatment Type: Treatment  PT/PTA: PT           03/14/2025

## 2025-03-14 NOTE — DISCHARGE SUMMARY
[facility] - Neurosurgery ([facility])  Neurosurgery  Discharge Summary      Patient Name: Jerson Isabel  MRN: [id number]  Admission Date: 3/12/2025  Hospital Length of Stay: 2 days  Discharge Date and Time:  03/14/2025 11:16 AM  Attending Physician: Boogie Long   Discharging Provider: Celeste Bailey PA-C  Primary Care Provider: Ramsey Tran MD    HPI:   59 M presented to clinic with long standing neck pain and cervical radiculopathy. Imaging showed multilevel severe central stenosis from C4-6 and severe NF stenosis at C6/7. He had failed conservative mgmt. He was offered a C4-7 ACDF in order to decompress his neural elements and to stabilize the spine following.     Procedure(s) (LRB):  C4-7 ACDF (N/A)     Hospital Course: 03/13/2025 POD1. NAEON. AFVSS. Exam stable. Reports resolution of his radicular pain. Pain controlled. Tolerated his breakfast this AM. Ambulated well with PT. Drain with 65 cc output, will keep today. Post op XR without complication. Napoles in place, will remove today and f/u voiding.   3/14: Exam stable. Drain pulled out partially overnight and then completely this AM. Patient denies new symptoms. Reports pain to his posterior neck. Voiding spontaneously. Patient is medically stable for discharge to home. Incision care and activity recommendations reviewed. Plan of care discussed with patient and he voiced understanding. All questions were answered. Follow-up in Neurosurgery clinic arranged.     Physical Exam  General: Well developed, well nourished, not in acute distress.   Head: Normocephalic, atraumatic.  Neck: Collar in place.  Neurologic: Alert and oriented x 4. Thought content appropriate.  GCS: Motor:6  Verbal:5  Eyes:4   GCS Total: 15  Language: No aphasia.  Speech: No dysarthria.  Cranial nerves: Face symmetric, tongue midline, CN II-XII grossly intact.   Eyes: Pupils equal, round, reactive to light with accomodation, EOMI. Unable to appreciate optic disc. Red reflex intact  bilaterally.   Ears: No drainage. No erythema.  Pulmonary: Normal respirations, no signs of respiratory distress.  Abdomen: Soft, non-distended, non-tender to palpation.  Sensory: Intact to light touch throughout.  Motor Strength: Moves all extremities spontaneously with good tone. Full strength upper and lower extremities. No abnormal movements seen.      Strength   Deltoids Triceps Biceps Wrist Extension Wrist Flexion Hand    Upper: R 5/5 5/5 5/5 5/5 5/5 5/5     L 5/5 5/5 5/5 5/5 5/5 5/5       Hip Flexion Knee Extension Knee  Flexion Dorsiflexion Plantar flexion EHL   Lower: R 5/5 5/5 5/5 5/5 5/5 5/5     L 5/5 5/5 5/5 5/5 5/5 5/5   4+/5 b/l HI     DTRs: 2+ and symmetric in UEs and LEs.  Skin: Skin is warm, dry and intact.     Incision c/d/I with dermabond.          Goals of Care Treatment Preferences:         Consults:     Significant Diagnostic Studies: Labs: CMP   Recent Labs   Lab 03/12/25  1210 03/12/25  2334    139   K 4.2 4.1    109   CO2 24 20*   * 154*   BUN 23* 21*   CREATININE 0.8 0.8   CALCIUM 9.5 8.5*   ANIONGAP 10 10   , CBC   Recent Labs   Lab 03/12/25  1210 03/12/25  2334   WBC 5.51 8.36   HGB 15.0 13.6*   HCT 46.2 42.3   * 113*   , and All labs within the past 24 hours have been reviewed  Radiology: X-Ray: cervical spine AP and Lateral    Pending Diagnostic Studies:       None          Final Active Diagnoses:    Diagnosis Date Noted POA    PRINCIPAL PROBLEM:  Cervical radiculopathy [M54.12] 09/17/2015 Yes    Diabetes mellitus, type II [E11.9] 09/17/2015 Yes    HTN (hypertension) [I10] 09/24/2014 Yes      Problems Resolved During this Admission:      Discharged Condition: good     Disposition: Home or Self Care    Follow Up:    Patient Instructions:      Notify your health care provider if you experience any of the following:  temperature >100.4     Notify your health care provider if you experience any of the following:  persistent nausea and vomiting or diarrhea      Notify your health care provider if you experience any of the following:  increased confusion or weakness     Notify your health care provider if you experience any of the following:  persistent dizziness, light-headedness, or visual disturbances     Notify your health care provider if you experience any of the following:  worsening rash     Notify your health care provider if you experience any of the following:  severe persistent headache     Notify your health care provider if you experience any of the following:  difficulty breathing or increased cough     Notify your health care provider if you experience any of the following:  redness, tenderness, or signs of infection (pain, swelling, redness, odor or green/yellow discharge around incision site)     Notify your health care provider if you experience any of the following:  severe uncontrolled pain     No dressing needed     Activity as tolerated     Medications:  Reconciled Home Medications:      Medication List        PAUSE taking these medications      aspirin 81 MG Chew  Wait to take this until: March 26, 2025  Take 81 mg by mouth once daily.            START taking these medications      methocarbamoL 500 MG Tab  Commonly known as: Robaxin  Take 1 tablet (500 mg total) by mouth 3 (three) times daily as needed (pain).     oxyCODONE-acetaminophen 5-325 mg per tablet  Commonly known as: PERCOCET  Take 1 tablet by mouth every 6 (six) hours as needed for Pain.            CONTINUE taking these medications      aluminum & magnesium hydroxide-simethicone 400-400-40 mg/5 mL suspension  Commonly known as: MAALOX MAXIMUM STRENGTH  Take 15 mLs by mouth every 6 (six) hours as needed for Indigestion.     atorvastatin 10 MG tablet  Commonly known as: LIPITOR  Take 1 tablet (10 mg total) by mouth once daily.     erythromycin ophthalmic ointment  Commonly known as: ROMYCIN  Place a 1/2 inch ribbon of ointment into the lower eyelid TID for 7 days     famotidine 20 MG  tablet  Commonly known as: PEPCID  Take 1 tablet (20 mg total) by mouth 2 (two) times daily.     glipiZIDE 10 MG tablet  Commonly known as: GLUCOTROL  Take 10 mg by mouth 2 (two) times daily with meals.     GLUCOSAMINE-CHONDROITIN-MSM ORAL  Take 1 tablet by mouth 2 (two) times daily.     lisinopriL 20 MG tablet  Commonly known as: PRINIVIL,ZESTRIL  Take 20 mg by mouth once daily.     metFORMIN 1000 MG tablet  Commonly known as: GLUCOPHAGE  Take 1 tablet (1,000 mg total) by mouth 2 (two) times daily with meals.     ondansetron 4 MG Tbdl  Commonly known as: ZOFRAN-ODT  Take 1 tablet (4 mg total) by mouth every 8 (eight) hours as needed (nausea/vomiting).     OZEMPIC 0.25 mg or 0.5 mg (2 mg/3 mL) pen injector  Generic drug: semaglutide  Inject 0.5 mg into the skin every 7 days.              Celeste Bailey PA-C  Neurosurgery  [facility] - [facility])

## 2025-03-14 NOTE — NURSING
Patient attempted to get OOB and partially pulled hemovac tubing from insertion site to neck. Patient noted asymptomatic. Hemovac drain noted dysfunctional not flowing to full suction per MD orders. Boogie Vázquez MD on-call for neurosurgery notified that family at bedside concern of placement. SHANI Vázquez MD stated to leave Hemovac drain to gravity and will visit patient in the morning at bedside.

## 2025-03-17 ENCOUNTER — TELEPHONE (OUTPATIENT)
Dept: NEUROSURGERY | Facility: CLINIC | Age: 60
End: 2025-03-17
Payer: MEDICAID

## 2025-03-17 NOTE — TELEPHONE ENCOUNTER
----- Message from Zari sent at 3/17/2025  3:15 PM CDT -----  Regarding: Pt called states need to speak with someone regarding Squeaking in his neck when he moves back and fourth  Contact: [phone]  Name of Who is Calling:MARIA ELENA AVITIA [[id number]]  What is the request in detail:Pt called states need to speak with someone regarding Squeaking in his neck when he moves back and fourth. Please advise   Can the clinic reply by [facility]:No  What Number to Call Back if not in [facility]: [phone]

## 2025-03-17 NOTE — TELEPHONE ENCOUNTER
Informed pt that he should be wearing button up shirts and wearing brace while up and moving. If he starts to experience any muscle weakness, to give us a call. He verbalized understanding.

## 2025-03-19 ENCOUNTER — TELEPHONE (OUTPATIENT)
Dept: NEUROSURGERY | Facility: CLINIC | Age: 60
End: 2025-03-19
Payer: MEDICAID

## 2025-03-19 NOTE — TELEPHONE ENCOUNTER
P/c to pt. He has not been taking his pain medication as prescribed.  Instructed to take it as prescribed and if pt still continues with pain to call us back. Pt. Denies any swelling at site or any swallowing difficulties.

## 2025-03-19 NOTE — TELEPHONE ENCOUNTER
----- Message from Med Assistant Araya sent at 3/19/2025 10:20 AM CDT -----  Regarding: FW: Post Op C  Contact: [phone]  Can you give the patient call?  ----- Message -----  From: Jonathan James  Sent: 3/19/2025  10:01 AM CDT  To: Ethan SILVEIRA Staff  Subject: Post Op C                                        Calling to speak with provider or nurse regarding post op surgery concerns. Patient stated he has severe pain in his neck and wanted to know if its normal. Please call and discuss  with patient as soon as possible.

## 2025-03-26 ENCOUNTER — CLINICAL SUPPORT (OUTPATIENT)
Dept: NEUROSURGERY | Facility: CLINIC | Age: 60
End: 2025-03-26
Payer: MEDICAID

## 2025-03-26 VITALS — DIASTOLIC BLOOD PRESSURE: 78 MMHG | HEART RATE: 79 BPM | TEMPERATURE: 98 F | SYSTOLIC BLOOD PRESSURE: 115 MMHG

## 2025-03-26 DIAGNOSIS — Z98.1 S/P CERVICAL SPINAL FUSION: Primary | ICD-10-CM

## 2025-03-26 DIAGNOSIS — M54.12 CERVICAL RADICULOPATHY: ICD-10-CM

## 2025-03-26 PROCEDURE — 99213 OFFICE O/P EST LOW 20 MIN: CPT | Mod: PBBFAC

## 2025-03-26 PROCEDURE — 99999 PR PBB SHADOW E&M-EST. PATIENT-LVL III: CPT | Mod: PBBFAC,,,

## 2025-03-26 RX ORDER — METHOCARBAMOL 500 MG/1
500 TABLET, FILM COATED ORAL 3 TIMES DAILY
Qty: 15 TABLET | Refills: 0 | Status: SHIPPED | OUTPATIENT
Start: 2025-03-26 | End: 2025-03-31

## 2025-03-26 RX ORDER — OXYCODONE AND ACETAMINOPHEN 5; 325 MG/1; MG/1
1 TABLET ORAL EVERY 8 HOURS PRN
Qty: 21 TABLET | Refills: 0 | Status: SHIPPED | OUTPATIENT
Start: 2025-03-26

## 2025-03-26 NOTE — PROGRESS NOTES
Jerson Isabel is a 59 y.o. male here for 2 week post op wound check here on 8th floor neurosurgery clinic    Surgery:C4-7 acdf     Symptoms: pain in back of neck    DME:stue    Brace: traci oakes    Pain: 7 - posterior neck    Medication Refills: yes percocet and methocarbomol ( discussed with JOANNE Whitten)         Incision with dermabond JUAN, well approximated, no redness, swelling or purulent drainage.     EDUCATION:    Instructed patient to keep incision JUAN, no lotions, creams or bandages. OK to shower without water pressure to incision. No scrubbing. Pat dry.  No submurging incision in water (no bathing/swimming) until 8 weeks after surgery once wound is healed. Do not lift more than 10 pounds. Avoid bending or twisting in the area of your surgery more than 45 degrees from neutral position in any direction. Wear brace at all times when up out of bed except when showering or flat in bed. Increase ambulation as tolerated. You should be walking 2 miles/day. Walk on paved surfaces only, holding side rail when using stairs.  No sexual activity for 6 weeks after surgery.  No driving or operating heavy machinary : until cleared by surgeon  or while you are taking narcotic pain medication or muscle relaxant.  If you have had a fusion, do not take any non steroidal anti-inflammatory drugs (NSAIDS) Including the following: Ibuprofen, naprosyn, Aleve, Advil, Indocin, Mobic, or Celebrex for 6 weeks.  Take docusate (colace 100mg): take 1 capsule a day as needed for constipation. You can purchase over the counter.  Avoid use of tobacco products.    Call your doctor or go to the Emergency Room for any signs of infection including: increased redness, drainage, pain or fever (temperature of 101.5 or above for 24hrs). Call your doctor or go to the ER if there are any localized neurological changes, problems with speech, vision,numbness,tingling,weakness,severe headache or other concerns.  Above instructions reviewed with pt and  copy of instructions given to pt along with follow up appt date and time.    Physicians need 3 days' notice for pain medicine to be refilled. Pain medicine will only be refilled between 8am and 5pm Monday-Friday.    Patient verbalizes understanding. All questions answered. Pt. Encouraged to call or send message thru the portal for any additional concerns. Patient to followup with Dr. Matias 6 week followup /without imaging.    Future Appointments   Date Time Provider Department Center   4/16/2025 10:30 AM [facility] OIC-XRAY NOM XRAY [facility] Imaging [facility]   4/16/2025 11:30 AM Brit Fernandez, NP [facility]   5/27/2025  3:00 PM Boogie Long,  [facility] NEUROS8 [facility]

## 2025-04-16 ENCOUNTER — HOSPITAL ENCOUNTER (OUTPATIENT)
Dept: RADIOLOGY | Facility: HOSPITAL | Age: 60
Discharge: HOME | End: 2025-04-16
Attending: STUDENT IN AN ORGANIZED HEALTH CARE EDUCATION/TRAINING PROGRAM
Payer: MEDICAID

## 2025-04-16 ENCOUNTER — OFFICE VISIT (OUTPATIENT)
Dept: NEUROSURGERY | Facility: CLINIC | Age: 60
End: 2025-04-16
Payer: MEDICAID

## 2025-04-16 VITALS
WEIGHT: 189.13 LBS | BODY MASS INDEX: 29.63 KG/M2 | HEART RATE: 82 BPM | SYSTOLIC BLOOD PRESSURE: 109 MMHG | TEMPERATURE: 97 F | DIASTOLIC BLOOD PRESSURE: 73 MMHG

## 2025-04-16 DIAGNOSIS — M48.02 SPINAL STENOSIS, CERVICAL REGION: Primary | ICD-10-CM

## 2025-04-16 DIAGNOSIS — Z98.1 S/P CERVICAL SPINAL FUSION: ICD-10-CM

## 2025-04-16 DIAGNOSIS — M50.20 HNP (HERNIATED NUCLEUS PULPOSUS), CERVICAL: ICD-10-CM

## 2025-04-16 DIAGNOSIS — M25.519 SHOULDER PAIN, UNSPECIFIED CHRONICITY, UNSPECIFIED LATERALITY: ICD-10-CM

## 2025-04-16 DIAGNOSIS — M47.12 CERVICAL SPONDYLOSIS WITH MYELOPATHY: ICD-10-CM

## 2025-04-16 PROCEDURE — 3074F SYST BP LT 130 MM HG: CPT | Mod: CPTII,,, | Performed by: NURSE PRACTITIONER

## 2025-04-16 PROCEDURE — 99024 POSTOP FOLLOW-UP VISIT: CPT | Mod: ,,, | Performed by: NURSE PRACTITIONER

## 2025-04-16 PROCEDURE — 72040 X-RAY EXAM NECK SPINE 2-3 VW: CPT | Mod: TC

## 2025-04-16 PROCEDURE — 99999 PR PBB SHADOW E&M-EST. PATIENT-LVL III: CPT | Mod: PBBFAC,,, | Performed by: NURSE PRACTITIONER

## 2025-04-16 PROCEDURE — 72040 X-RAY EXAM NECK SPINE 2-3 VW: CPT | Mod: 26,,, | Performed by: RADIOLOGY

## 2025-04-16 PROCEDURE — 99213 OFFICE O/P EST LOW 20 MIN: CPT | Mod: PBBFAC,25 | Performed by: NURSE PRACTITIONER

## 2025-04-16 PROCEDURE — 3078F DIAST BP <80 MM HG: CPT | Mod: CPTII,,, | Performed by: NURSE PRACTITIONER

## 2025-04-16 PROCEDURE — 1160F RVW MEDS BY RX/DR IN RCRD: CPT | Mod: CPTII,,, | Performed by: NURSE PRACTITIONER

## 2025-04-16 PROCEDURE — 1159F MED LIST DOCD IN RCRD: CPT | Mod: CPTII,,, | Performed by: NURSE PRACTITIONER

## 2025-04-16 PROCEDURE — 4010F ACE/ARB THERAPY RXD/TAKEN: CPT | Mod: CPTII,,, | Performed by: NURSE PRACTITIONER

## 2025-04-16 NOTE — PROGRESS NOTES
Neurosurgery  Established Patient    SUBJECTIVE:     History of Present Illness: Jerson Isabel is a 59 y.o. male s/p ACD C4-7 with Dr. Long on 3/12/25. He is being seen in clinic today for his 6 week post-op evaluation. States that he is doing well since surgery with improvement in his neck and arm pain as well as numbness and tingling. Reports some pain in the shoulders and should blade. Denies fever, chills, or new neurological deficits. States that with swallowing certain foods/pills that it will get stuck but is then able to move the object with more water. He has been compliant with the cervical collar and bone stimulator.     Review of patient's allergies indicates:  No Known Allergies    Current Medications[1]    Past Medical History:   Diagnosis Date    Chronic kidney disease     Diabetes mellitus, type 2     Hypertension      Past Surgical History:   Procedure Laterality Date    DECOMPRESSION OF CERVICAL SPINE BY ANTERIOR APPROACH WITH FUSION N/A 3/12/2025    Procedure: C4-7 ACDF;  Surgeon: Boogie Long DO;  Location: [facility] OR [address] [place] [address];  Service: Neurosurgery;  Laterality: N/A;     Family History       Problem Relation (Age of Onset)    Diabetes Sister    Heart attack Mother, Father    Hypertension Mother, Father    No Known Problems Brother, Maternal Aunt, Maternal Uncle, Paternal Aunt, Paternal Uncle, Maternal Grandmother, Maternal Grandfather, Paternal Grandmother, Paternal Grandfather          Social History     Socioeconomic History    Marital status:    Occupational History     Employer: [employer]   Tobacco Use    Smoking status: Never    Smokeless tobacco: Never   Substance and Sexual Activity    Alcohol use: No    Drug use: No    Sexual activity: Yes     Partners: Female     Social Drivers of Health     Financial Resource Strain: Low Risk  (3/13/2025)    Overall Financial Resource Strain (CARDIA)     Difficulty of Paying Living Expenses: Not hard at all   Food Insecurity: No Food  Insecurity (3/13/2025)    Hunger Vital Sign     Worried About Running Out of Food in the Last Year: Never true     Ran Out of Food in the Last Year: Never true   Physical Activity: Insufficiently Active (3/13/2025)    Exercise Vital Sign     Days of Exercise per Week: 3 days     Minutes of Exercise per Session: 10 min   Stress: Stress Concern Present (3/13/2025)    [employer] - Occupational Stress Questionnaire     Feeling of Stress : To some extent   Housing Stability: Low Risk  (3/13/2025)    Housing Stability Vital Sign     Unable to Pay for Housing in the Last Year: No     Homeless in the Last Year: No       Review of Systems    OBJECTIVE:     Vital Signs  Temp: 97.1 °F (36.2 °C)  Pulse: 82  BP: 109/73  Pain Score: 0-No pain  Weight: 85.8 kg (189 lb 2.5 oz)  Body mass index is 29.63 kg/m².    Neurosurgery Physical Exam  General: well developed, well nourished, no distress.   Head: normocephalic, atraumatic  Neurologic: Alert and oriented. Thought content appropriate.  GCS: Motor: 6/Verbal: 5/Eyes: 4 GCS Total: 15  Mental Status: Awake, Alert, Oriented x 4  Language: No aphasia  Speech: No dysarthria  Cranial nerves: face symmetric, tongue midline, CN II-XII grossly intact.   Eyes: pupils equal, round, reactive to light with accomodation, EOMI.   Pulmonary: normal respirations, no signs of respiratory distress  Abdomen: soft, non-distended  Skin: Skin is warm, dry and intact. Incision well-healed  Sensory: intact to light touch throughout  Motor Strength:Moves all extremities spontaneously with good tone.       Diagnostic Results:  I have personally reviewed the x-ray cervical spine dated 4/16/25 which shows ACD C4-7 with hardware in stable position.     ASSESSMENT/PLAN:     Jerson Isabel is a 59 y.o. male s/p ACD C4-7 with Dr. Long on 3/12/25. He is being seen in clinic today for his 6 week post-op evaluation. States that he is doing well since surgery. I have ordered PT to assist  with his shoulder pain. A CT cervical spine has also been ordered to assess for bony fusion in 6 weeks. I would like the patient to follow-up in clinic with Dr. Long for the 3 month post-op evaluation. I have encouraged him to contact the clinic with any questions, concerns, or adverse clinical changes. He verbalized understanding.      JUAN Tran-OZ  Neurosurgery  [facility].      Note dictated with voice recognition software, please excuse any grammatical errors.            [1]   Current Outpatient Medications   Medication Sig Dispense Refill    aspirin 81 MG Chew Take 81 mg by mouth once daily.      atorvastatin (LIPITOR) 10 MG tablet Take 1 tablet (10 mg total) by mouth once daily. 90 tablet 3    erythromycin (ROMYCIN) ophthalmic ointment Place a 1/2 inch ribbon of ointment into the lower eyelid TID for 7 days 1 Tube 0    glipiZIDE (GLUCOTROL) 10 MG tablet Take 10 mg by mouth 2 (two) times daily with meals.      GLUCOSAM/MSM/CHONDR/VIT C/HYAL (GLUCOSAMINE-CHONDROITIN-MSM ORAL) Take 1 tablet by mouth 2 (two) times daily.      lisinopril (PRINIVIL,ZESTRIL) 20 MG tablet Take 20 mg by mouth once daily.      metformin (GLUCOPHAGE) 1000 MG tablet Take 1 tablet (1,000 mg total) by mouth 2 (two) times daily with meals. 180 tablet 3    ondansetron (ZOFRAN-ODT) 4 MG TbDL Take 1 tablet (4 mg total) by mouth every 8 (eight) hours as needed (nausea/vomiting). 12 tablet 0    oxyCODONE-acetaminophen (PERCOCET) 5-325 mg per tablet Take 1 tablet by mouth every 8 (eight) hours as needed for Pain. 21 tablet 0    semaglutide (OZEMPIC) 0.25 mg or 0.5 mg (2 mg/3 mL) pen injector Inject 0.5 mg into the skin every 7 days. 6 mL 1    aluminum & magnesium hydroxide-simethicone (MAALOX MAXIMUM STRENGTH) 400-400-40 mg/5 mL suspension Take 15 mLs by mouth every 6 (six) hours as needed for Indigestion.  0    famotidine (PEPCID) 20 MG tablet Take 1 tablet (20 mg total) by mouth 2 (two) times daily. 20 tablet 0      No current facility-administered medications for this visit.

## 2025-04-22 ENCOUNTER — PATIENT OUTREACH (OUTPATIENT)
Dept: ADMINISTRATIVE | Facility: OTHER | Age: 60
End: 2025-04-22
Payer: MEDICAID

## 2025-04-22 ENCOUNTER — OFFICE VISIT (OUTPATIENT)
Dept: PRIMARY CARE CLINIC | Facility: CLINIC | Age: 60
End: 2025-04-22
Payer: MEDICAID

## 2025-04-22 VITALS
WEIGHT: 199.19 LBS | HEART RATE: 87 BPM | TEMPERATURE: 99 F | SYSTOLIC BLOOD PRESSURE: 121 MMHG | BODY MASS INDEX: 31.26 KG/M2 | DIASTOLIC BLOOD PRESSURE: 76 MMHG | OXYGEN SATURATION: 96 % | HEIGHT: 67 IN

## 2025-04-22 DIAGNOSIS — E11.00 TYPE 2 DIABETES MELLITUS WITH HYPEROSMOLARITY WITHOUT COMA, WITHOUT LONG-TERM CURRENT USE OF INSULIN: Primary | ICD-10-CM

## 2025-04-22 DIAGNOSIS — E78.49 OTHER HYPERLIPIDEMIA: ICD-10-CM

## 2025-04-22 DIAGNOSIS — I10 PRIMARY HYPERTENSION: ICD-10-CM

## 2025-04-22 DIAGNOSIS — G95.9 CERVICAL MYELOPATHY: ICD-10-CM

## 2025-04-22 PROCEDURE — 3008F BODY MASS INDEX DOCD: CPT | Mod: CPTII,,,

## 2025-04-22 PROCEDURE — 3078F DIAST BP <80 MM HG: CPT | Mod: CPTII,,,

## 2025-04-22 PROCEDURE — 4010F ACE/ARB THERAPY RXD/TAKEN: CPT | Mod: CPTII,,,

## 2025-04-22 PROCEDURE — 99213 OFFICE O/P EST LOW 20 MIN: CPT | Mod: PBBFAC,PN

## 2025-04-22 PROCEDURE — 99999 PR PBB SHADOW E&M-EST. PATIENT-LVL III: CPT | Mod: PBBFAC,,,

## 2025-04-22 PROCEDURE — 3044F HG A1C LEVEL LT 7.0%: CPT | Mod: CPTII,,,

## 2025-04-22 PROCEDURE — 99203 OFFICE O/P NEW LOW 30 MIN: CPT | Mod: S$PBB,,,

## 2025-04-22 PROCEDURE — G2211 COMPLEX E/M VISIT ADD ON: HCPCS | Mod: S$PBB,,,

## 2025-04-22 PROCEDURE — 3074F SYST BP LT 130 MM HG: CPT | Mod: CPTII,,,

## 2025-04-22 RX ORDER — AMLODIPINE BESYLATE 5 MG/1
5 TABLET ORAL DAILY
Qty: 90 TABLET | Refills: 3 | Status: SHIPPED | OUTPATIENT
Start: 2025-04-22 | End: 2026-04-22

## 2025-04-22 RX ORDER — DAPAGLIFLOZIN 10 MG/1
10 TABLET, FILM COATED ORAL DAILY
Qty: 30 TABLET | Refills: 5 | Status: SHIPPED | OUTPATIENT
Start: 2025-04-22 | End: 2025-10-19

## 2025-04-22 RX ORDER — GLIPIZIDE 10 MG/1
10 TABLET ORAL 2 TIMES DAILY WITH MEALS
Qty: 60 TABLET | Refills: 5 | Status: SHIPPED | OUTPATIENT
Start: 2025-04-22 | End: 2025-10-19

## 2025-04-22 RX ORDER — GEMFIBROZIL 600 MG/1
600 TABLET, FILM COATED ORAL
Qty: 180 TABLET | Refills: 3 | Status: SHIPPED | OUTPATIENT
Start: 2025-04-22 | End: 2026-04-22

## 2025-04-22 RX ORDER — METFORMIN HYDROCHLORIDE 1000 MG/1
1000 TABLET ORAL 2 TIMES DAILY WITH MEALS
Qty: 180 TABLET | Refills: 3 | Status: SHIPPED | OUTPATIENT
Start: 2025-04-22

## 2025-04-22 RX ORDER — SEMAGLUTIDE 0.68 MG/ML
0.5 INJECTION, SOLUTION SUBCUTANEOUS
Qty: 6 ML | Refills: 1 | Status: SHIPPED | OUTPATIENT
Start: 2025-04-22

## 2025-04-22 RX ORDER — LISINOPRIL 10 MG/1
10 TABLET ORAL DAILY
Qty: 30 TABLET | Refills: 5 | Status: SHIPPED | OUTPATIENT
Start: 2025-04-22 | End: 2025-10-19

## 2025-04-22 RX ORDER — ATORVASTATIN CALCIUM 10 MG/1
10 TABLET, FILM COATED ORAL DAILY
Qty: 90 TABLET | Refills: 3 | Status: SHIPPED | OUTPATIENT
Start: 2025-04-22

## 2025-04-22 RX ORDER — HYDROCHLOROTHIAZIDE 12.5 MG/1
12.5 TABLET ORAL DAILY
Qty: 30 TABLET | Refills: 5 | Status: SHIPPED | OUTPATIENT
Start: 2025-04-22 | End: 2025-10-19

## 2025-04-22 NOTE — PROGRESS NOTES
[facility] FAMILY PRACTICE CLINIC NOTE  New Patient Visit      SUBJECTIVE:     Patient: Jerson Isabel is a 59 y.o. male.    Chief Compliant:   Chief Complaint   Patient presents with    [facility]       History of Present Illness:  59 year old male Pmhx of T2DM, HTN, HLD, recent C4-C7 ACDF presenting today to [facility]. He has not acute issues or complaints today and wants medications refilled. He is accompanied with his wife. He currently follows with neurosurgery and has follow ups in place with neurosurgery on 5/27/25. He is amenable to     Review of patient's allergies indicates:  No Known Allergies    Past Medical History:   Diagnosis Date    Chronic kidney disease     Diabetes mellitus, type 2     Hypertension        Current Outpatient Medications   Medication Instructions    aluminum & magnesium hydroxide-simethicone (MAALOX MAXIMUM STRENGTH) 400-400-40 mg/5 mL suspension 15 mLs, Oral, Every 6 hours PRN    amLODIPine (NORVASC) 5 mg, Oral, Daily    aspirin 81 mg, Daily    atorvastatin (LIPITOR) 10 mg, Oral, Daily    dapagliflozin propanediol (FARXIGA) 10 mg, Oral, Daily    erythromycin (ROMYCIN) ophthalmic ointment Place a 1/2 inch ribbon of ointment into the lower eyelid TID for 7 days    famotidine (PEPCID) 20 mg, Oral, 2 times daily    gemfibroziL (LOPID) 600 mg, Oral, 2 times daily before meals    glipiZIDE (GLUCOTROL) 10 mg, Oral, 2 times daily with meals    GLUCOSAM/MSM/CHONDR/VIT C/HYAL (GLUCOSAMINE-CHONDROITIN-MSM ORAL) 1 tablet, 2 times daily    hydroCHLOROthiazide 12.5 mg, Oral, Daily    lisinopriL 10 mg, Oral, Daily    metFORMIN (GLUCOPHAGE) 1,000 mg, Oral, 2 times daily with meals    ondansetron (ZOFRAN-ODT) 4 mg, Oral, Every 8 hours PRN    oxyCODONE-acetaminophen (PERCOCET) 5-325 mg per tablet 1 tablet, Oral, Every 8 hours PRN    OZEMPIC 0.5 mg, Subcutaneous, Every 7 days       Past Surgical History:   Procedure Laterality Date    DECOMPRESSION OF CERVICAL SPINE BY ANTERIOR APPROACH WITH  "FUSION N/A 3/12/2025    Procedure: C4-7 ACDF;  Surgeon: Boogie Long DO;  Location: [facility] OR [address] [place] [address];  Service: Neurosurgery;  Laterality: N/A;       Family History   Problem Relation Name Age of Onset    Heart attack Mother      Hypertension Mother      Heart attack Father      Hypertension Father      Diabetes Sister      No Known Problems Brother      No Known Problems Maternal Aunt      No Known Problems Maternal Uncle      No Known Problems Paternal Aunt      No Known Problems Paternal Uncle      No Known Problems Maternal Grandmother      No Known Problems Maternal Grandfather      No Known Problems Paternal Grandmother      No Known Problems Paternal Grandfather      Amblyopia Neg Hx      Blindness Neg Hx      Cancer Neg Hx      Cataracts Neg Hx      Glaucoma Neg Hx      Macular degeneration Neg Hx      Retinal detachment Neg Hx      Strabismus Neg Hx      Stroke Neg Hx      Thyroid disease Neg Hx         Social History[1]       ROS  A 10+ review of systems was performed with pertinent positives and negatives noted above in the history of present illness. Other systems were negative unless otherwise specified.    OBJECTIVE:     Vital Signs (Most Recent)  Vitals:    04/22/25 1409   BP: 121/76   BP Location: Left arm   Patient Position: Sitting   Pulse: 87   Temp: 98.5 °F (36.9 °C)   TempSrc: Oral   SpO2: 96%   Weight: 90.4 kg (199 lb 3 oz)   Height: 5' 7" (1.702 m)     BMI: Body mass index is 31.2 kg/m².     Physical Exam:  Physical Exam  Constitutional:       Appearance: Normal appearance.   HENT:      Head: Normocephalic and atraumatic.      Mouth/Throat:      Mouth: Mucous membranes are moist.      Pharynx: Oropharynx is clear.   Eyes:      Extraocular Movements: Extraocular movements intact.      Conjunctiva/sclera: Conjunctivae normal.      Pupils: Pupils are equal, round, and reactive to light.   Neck:      Comments: Neck brace in place  Cardiovascular:      Rate and Rhythm: Normal rate and " regular rhythm.      Pulses: Normal pulses.      Heart sounds: Normal heart sounds.   Pulmonary:      Effort: Pulmonary effort is normal.      Breath sounds: Normal breath sounds.   Abdominal:      General: Abdomen is flat.      Palpations: Abdomen is soft.   Musculoskeletal:         General: Normal range of motion.      Cervical back: Normal range of motion and neck supple.   Skin:     General: Skin is warm and dry.      Capillary Refill: Capillary refill takes less than 2 seconds.   Neurological:      General: No focal deficit present.      Mental Status: He is alert and oriented to person, place, and time.   Psychiatric:         Mood and Affect: Mood normal.         Behavior: Behavior normal.         Thought Content: Thought content normal.          ASSESSMENT:   Jerson Isabel is a 59 y.o. male who presents to clinic for    1. Type 2 diabetes mellitus with hyperosmolarity without coma, without long-term current use of insulin    2. Other hyperlipidemia    3. Primary hypertension    4. Cervical myelopathy         PLAN:     Type 2 diabetes mellitus with hyperosmolarity without coma, without long-term current use of insulin  -     metFORMIN (GLUCOPHAGE) 1000 MG tablet; Take 1 tablet (1,000 mg total) by mouth 2 (two) times daily with meals.  Dispense: 180 tablet; Refill: 3  -     semaglutide (OZEMPIC) 0.25 mg or 0.5 mg (2 mg/3 mL) pen injector; Inject 0.5 mg into the skin every 7 days.  Dispense: 6 mL; Refill: 1  -     glipiZIDE (GLUCOTROL) 10 MG tablet; Take 1 tablet (10 mg total) by mouth 2 (two) times daily with meals.  Dispense: 60 tablet; Refill: 5  -     dapagliflozin propanediol (FARXIGA) 10 mg tablet; Take 1 tablet (10 mg total) by mouth once daily.  Dispense: 30 tablet; Refill: 5  -     Hemoglobin A1C; Future; Expected date: 04/22/2025  -     Comprehensive Metabolic Panel; Future; Expected date: 04/22/2025  -     Microalbumin/creatinine urine ratio; Future; Expected date: 04/22/2025  -     Ambulatory  referral/consult to Podiatry; Future; Expected date: 04/29/2025  - Chronic, uncontrolled  - Per CGM, average glucose in the 180s, last A1c 9 years ago 8.7. Denies any new catabolic symptoms.  - Will obtain new A1c today in light of no recent A1c and average BG on CGM out of range.    Other hyperlipidemia  -     atorvastatin (LIPITOR) 10 MG tablet; Take 1 tablet (10 mg total) by mouth once daily.  Dispense: 90 tablet; Refill: 3  -     gemfibroziL (LOPID) 600 MG tablet; Take 1 tablet (600 mg total) by mouth 2 (two) times daily before meals.  Dispense: 180 tablet; Refill: 3  -     Lipid Panel; Future; Expected date: 04/22/2025    Primary hypertension  -     amLODIPine (NORVASC) 5 MG tablet; Take 1 tablet (5 mg total) by mouth once daily.  Dispense: 90 tablet; Refill: 3  -     lisinopriL 10 MG tablet; Take 1 tablet (10 mg total) by mouth once daily.  Dispense: 30 tablet; Refill: 5  -     hydroCHLOROthiazide 12.5 MG Tab; Take 1 tablet (12.5 mg total) by mouth once daily.  Dispense: 30 tablet; Refill: 5  -     HIV 1/2 Ag/Ab (4th Gen); Future; Expected date: 04/22/2025  - Chronic, controlled  - Continue lifestyle modifications in addition to pharmacotherapy including but not limited to diet and exercise.     Cervical myelopathy  - S/p C4-C7 ACDF March 2025, follow up with neurosurgery      Provided patient with anticipatory guidance and return precautions. Treatment plan discussed with patient, all questions answered, and patient acknowledged understanding and verbal agreement.      Follow-up in: 4 weeks; or sooner PRN if acute concerns arise.    Case discussed with Dr. SCOTTY Garcia Do, MD  [facility] Family Medicine PGY-2       [1]   Social History  Tobacco Use    Smoking status: Never    Smokeless tobacco: Never   Substance Use Topics    Alcohol use: No    Drug use: No

## 2025-04-22 NOTE — PROGRESS NOTES
CHW - Initial Contact    This Community Health Worker completed the Social Determinant of Health questionnaire with patient during clinic visit today.    Pt identified barriers of most importance are: NONE   Referrals to community agencies completed with patient/caregiver consent outside of [facility] include: NONE  Referrals were put through [facility] - : NO  Support and Services: No support & services have been documented.  Other information discussed the patient needs / wants help with: NONE   Follow up required: NO  No future outreach task assigned

## 2025-05-06 ENCOUNTER — CLINICAL SUPPORT (OUTPATIENT)
Dept: REHABILITATION | Facility: HOSPITAL | Age: 60
End: 2025-05-06
Payer: MEDICAID

## 2025-05-06 DIAGNOSIS — R29.898 DECREASED ROM OF NECK: Primary | ICD-10-CM

## 2025-05-06 DIAGNOSIS — Z98.1 S/P CERVICAL SPINAL FUSION: ICD-10-CM

## 2025-05-06 DIAGNOSIS — M25.519 SHOULDER PAIN, UNSPECIFIED CHRONICITY, UNSPECIFIED LATERALITY: ICD-10-CM

## 2025-05-06 DIAGNOSIS — M25.619 DECREASED RANGE OF MOTION OF SHOULDER, UNSPECIFIED LATERALITY: ICD-10-CM

## 2025-05-06 DIAGNOSIS — R29.898 DECREASED STRENGTH OF UPPER EXTREMITY: ICD-10-CM

## 2025-05-06 DIAGNOSIS — M48.02 SPINAL STENOSIS, CERVICAL REGION: ICD-10-CM

## 2025-05-06 PROCEDURE — 97162 PT EVAL MOD COMPLEX 30 MIN: CPT | Mod: PN

## 2025-05-06 PROCEDURE — 97112 NEUROMUSCULAR REEDUCATION: CPT | Mod: PN

## 2025-05-06 NOTE — PROGRESS NOTES
Outpatient Rehab    Physical Therapy Evaluation    Patient Name: Jerson Isabel  MRN: [id number]  YOB: 1965  Encounter Date: 5/6/2025    Therapy Diagnosis:   Encounter Diagnoses   Name Primary?    Spinal stenosis, cervical region     S/P cervical spinal fusion     Shoulder pain, unspecified chronicity, unspecified laterality     Decreased ROM of neck Yes    Decreased range of motion of shoulder, unspecified laterality     Decreased strength of upper extremity      Physician: Brit Fernandez NP    Physician Orders: Eval and Treat  Medical Diagnosis: Spinal stenosis, cervical region  S/P cervical spinal fusion  Shoulder pain, unspecified chronicity, unspecified laterality    Visit # / Visits Authorized:  1 / 1  Insurance Authorization Period: 4/16/2025 to 4/16/2026  Date of Evaluation: 5/6/2025  Plan of Care Certification: 5/6/2025 to 7/4/25     Time In: 1310   Time Out: 1400  Total Time (in minutes): 50   Total Billable Time (in minutes): 50    Intake Outcome Measure for FOTO Survey    Therapist reviewed FOTO scores for Jerson Isabel on 5/6/2025.   FOTO report - see Media section or FOTO account episode details.     Intake Score: 40%    Precautions     ACDF precautions      Subjective   History of Present Illness  Jerson is a 59 y.o. male who reports to physical therapy with a chief concern of neck and shoulder pain.     The patient reports a medical diagnosis of M48.02 (ICD-10-CM) - Spinal stenosis, cervical region  Z98.1 (ICD-10-CM) - S/P cervical spinal fusion  M25.519 (ICD-10-CM) - Shoulder pain, unspecified chronicity, unspecified laterality.  Patient reports a surgery of s/p ACD C4-7 with Dr. Long on 3/12/25.. Surgery occurred on 03/12/25.         History of Present Condition/Illness: Jerson Isabel is a 59 y.o. male s/p ACD C4-7 with Dr. Long on 3/12/25. He felt good for a week then pain started to return. History of worsening chronic neck pain and worsening upper extremity and   strength prior to surgery. He saw a chiropractor who improved pain 60% before surgery but continued to have moderate pain with rotation and radiating pain so decided to proceed with surgical intervention. Since the surgery he has had difficulty sleeping. Localized pain in bilateral shoulders, right > left. He has complaints of weakness in hands. He was dealing with the weakness prior to surgery. He occasionally drops things and has difficulty drinking from cup. Also with some osteoarthritis in hands from work as .  He reports throbbing sensation in the backs of upper arms. He had numbness and tingling prior to surgery which resolved after. He is still in collar and uses bone stimulator most waking hours. He commutes between homes in [place] and [place], but [place] is primary residence and has [place] insurance. Does report cold sensitivity. He was a  and he is considering going back to work as he was denied from disability once he is able.  Overall recent follow up went well, CT scheduled later this month. Denies difficulty walking or falls. Does reports occasional dizziness and hearing loss on left and if he gets a could he will be more off balance due to Menieres disease. When he sleeps poorly he wakes with headache. Headaches located on top of head. Sometimes has difficulty swallowing. No changes in bowel/bladder.     Activities of Daily Living  Social history was obtained from Patient.    General Prior Level of Function Comments: prior   General Current Level of Function Comments: limited with ADLs  Patient Roles: Caregiver for child  Patient Responsibilities: Driving, Community mobility, Health management, Home management, Laundry, Meal prep, Personal ADL, Yard work, Shopping, Financial management    Previously independent with activities of daily living? No  Activities previously needing assistance include Dressing - upper body.   Currently independent with activities of  daily living? No  Activities currently needing assistance include Bathing and Dressing - upper body.        Previously independent with instrumental activities of daily living? No  Activities previously needing assistance include: Driving, Home establishment and management, and Meal prep.   Currently independent with instrumental activities of daily living? No  Activities currently needing assistance include: Community mobility, Meal prep, Home establishment and management, and Grocery/shopping.            Pain     Patient reports a current pain level of 8/10. Pain at best is reported as 6/10. Pain at worst is reported as 10/10.   Location: B neck and R shoulders  Clinical Progression (since onset): Stable  Pain Qualities: Throbbing, Dull, Sharp, Tightness  Pain-Relieving Factors: Heat, Medications - prescription  Pain-Aggravating Factors: Driving, Head movements, Sitting, Sleeping, Movement, Reaching, Lifting         Review of Systems  Patient reports: Dizziness, Headache, Sleep Disturbance, Tinnitus, Diabetes, Decreased Range of Motion, Difficulty Swallowing, Hearing Loss, Intolerance of Cold, Muscle Tension, Neck Pain, Stiffness, and Weakness  Patient denies: Bladder Incontinence, Bowel Incontinence, Nausea, Saddle Numbness, Bladder Urgency, Bowel Urgency, Falls, Numbness, Tingling, Vision Loss, Visual Disturbance, and Vomiting  Additional Review of Systems Details: History of menieres disease-->hearing loss, tinnitus, dizziness     Treatment History  Treatments  Previously Received Treatments: Yes  Previous Treatments: Chiropractic  Currently Receiving Treatments: No    Living Arrangements  Living Situation  Housing: Home independently  Living Arrangements: Spouse/significant other, Children  Support Systems: Spouse/significant other, Family members    Home Setup  Type of Structure: House  Number of Levels in Home: Two level  Access to Alternate Level of Home: Stairs with rails  Primary Bedroom: 1st  floor  Primary Bathroom: 1st floor        Employment  Patient reports: Does the patient's condition impact their ability to work?  Employment Status: Not employed    last worked 8-9 yrs ago      Past Medical History/Physical Systems Review:   Jerson Isabel  has a past medical history of Chronic kidney disease, Diabetes mellitus, type 2, and Hypertension.    Jerson Isabel  has a past surgical history that includes Decompression of cervical spine by anterior approach with fusion (N/A, 3/12/2025).    Jerson has a current medication list which includes the following prescription(s): aluminum & magnesium hydroxide-simethicone, amlodipine, aspirin, atorvastatin, dapagliflozin propanediol, erythromycin, famotidine, gemfibrozil, glipizide, glucosamine/msm/chondrt/c/hyal, hydrochlorothiazide, lisinopril, metformin, ondansetron, oxycodone-acetaminophen, and ozempic.    Review of patient's allergies indicates:  No Known Allergies     Objective   Bracing  Patient presents with a cervical/thoracic brace type of Soft collar.  Bone stimulator        Posture  Patient presents with a Forward head position. Increased thoracic kyphosis is observed.   Shoulders are Rounded.             Cervical Thoracic Sensation  Right Cervical/Thoracic Sensation  Intact: Light Touch       Left Cervical/Thoracic Sensation  Intact: Light Touch                Right Upper Extremity Reflexes       Biceps, C5-C6: Normal (2+)    Brachioradialis, C6: Normal (2+)    Triceps, C7: Normal (2+)    Andrade's Sign: No    Left Upper Extremity Reflexes       Biceps, C5-C6: Normal (2+)    Brachioradialis, C6: Normal (2+)    Triceps, C7: Normal (2+)    Andrade's Sign: No        Spinal Muscle Palpation     Tenderness to cervical paraspinals and Upper Traps bilateral        Tenderness to cervical paraspinals and Upper Traps bilateral     Shoulder Palpation     Tenderness to supraspinatus and LHB        Tenderness to supraspinatus and LHB         Shoulder Range of  Motion  Right Shoulder   Active (deg) Passive (deg) Pain   Flexion 120 145 Yes   Extension         Scaption         ABduction 100 125 Yes   ADduction         Horizontal ABduction         Horizontal ADduction         External Rotation (Shoulder ABducted 0 degrees)         External Rotation (Shoulder ABducted 45 degrees)         External Rotation (Shoulder ABducted 90 degrees)         Internal Rotation (Shoulder ABducted 0 degrees)         Internal Rotation (Shoulder ABducted 45 degrees)         Internal Rotation (Shoulder ABducted 90 degrees)  (L5)   Yes     Left Shoulder   Active (deg) Passive (deg) Pain   Flexion 125 150 Yes   Extension         Scaption         ABduction 100 135 Yes   ADduction         Horizontal ABduction         Horizontal ADduction         External Rotation (Shoulder ABducted 0 degrees)         External Rotation (Shoulder ABducted 45 degrees)         External Rotation (Shoulder ABducted 90 degrees)         Internal Rotation (Shoulder ABducted 0 degrees)         Internal Rotation (Shoulder ABducted 45 degrees)         Internal Rotation (Shoulder ABducted 90 degrees)  (L5)   Yes            Elbow/Forearm Range of Motion   Right Elbow/Forearm   Active (deg) Passive (deg) Pain   Flexion 110       Extension 17       Forearm Pronation         Forearm Supination           Left Elbow/Forearm   Active (deg) Passive (deg) Pain   Flexion 125       Extension 0       Forearm Pronation         Forearm Supination                       Shoulder Strength - Planes of Motion   Right Strength Right Pain Left Strength Left  Pain   Flexion 4 Yes 4 Yes   Extension 4+   5     ABduction 4 Yes 4 Yes   ADduction           Horizontal ABduction           Horizontal ADduction           Internal Rotation 0° 4+   5     Internal Rotation 90°           External Rotation 0° 4+   4+     External Rotation 90°               Elbow Strength   Right Strength Right Pain Left Strength Left  Pain   Flexion (C6) 4+   4+     Extension (C7)  4+   5                    Shoulder Special Tests  Rotator Cuff Tests  Positive: Right Empty Can and Left Empty Can  Negative: Right Drop Arm, Left Drop Arm, Right External Rotation Lag Sign, and Left External Rotation Lag Sign  Negative: Right Internal Rotation Lag Sign and Left Internal Rotation Lag Sign  Impingement Tests  Positive: Right Cross Body ADduction, Right De Leon-Gm, Left De Leon-Gm, Right Neer's, and Left Neer's  Negative: Left Cross Body ADduction           Shoulder Joint Mobility  Right Shoulder Mobility  Hypomobile: Posterior Capsule Mobility and Long Axis Distraction Mobility  Left Shoulder Mobility  Hypomobile: Posterior Capsule Mobility and Long Axis Distraction Mobility                        Gait Analysis  Gait Analysis Details  Increased kyphosis, forward trunk lean but non-antalgic/non-ataxic         Treatment:  Balance/Neuromuscular Re-Education  NMR 1: chin tucks 10x supine  NMR 2: scap retractions 10x    Time Entry(in minutes):  PT Evaluation (Moderate) Time Entry: 40  Neuromuscular Re-Education Time Entry: 10    Assessment & Plan   Assessment  Jerson presents with a condition of Moderate complexity.   Presentation of Symptoms: Stable  Will Comorbidities Impact Care: Yes  See medical hx    Functional Limitations: Activity tolerance, Carrying objects, Completing self-care activities, Completing work/school activities, Functional mobility, Pain with ADLs/IADLs, Pain when reaching, Participating in leisure activities, Range of motion, Performing household chores  Impairments: Abnormal or restricted range of motion, Activity intolerance, Impaired physical strength, Lack of appropriate home exercise program, Pain with functional activity  Personal Factors Affecting Prognosis: Transportation    Patient Goal for Therapy (PT): return to daily activities with decreased pain  Prognosis: Good  Assessment Details: Jerson Isabel is a 59 y.o. male s/p ACD C4-7 with Dr. Long on 3/12/25 with a  medical diagnosis of M48.02 (ICD-10-CM) - Spinal stenosis, cervical region Z98.1 (ICD-10-CM) - S/P cervical spinal fusion M25.519 (ICD-10-CM) - Shoulder pain, unspecified chronicity, unspecified laterality. Patient presents 8 weeks s/p C4-7 ACDF in cervical collar with bone stimulator. Patient with forward head posture and increased kyphosis. Patient demonstrates decreased cervical and shoulder range of motion, impaired upper extremity strength, impaired shoulder mobility, impaired functional mobility. Resolved numbness and tingling since surgery. Normal reflexes. Patient appears to have concomitant signs and symptoms consistent with shoulder impingement in addition to weakness related to stenosis with + neers, De Leon corie, swimmer arm-to-shoulder test.  Patients impairments impact his ability to sleep, drive, and perform daily activities without pain. He will benefit from skilled PT to address these impairments.     Plan  From a physical therapy perspective, the patient would benefit from: Skilled Rehab Services    Planned therapy interventions include: Therapeutic exercise, Therapeutic activities, Neuromuscular re-education, and Manual therapy.    Planned modalities to include: Electrical stimulation - attended, Electrical stimulation - passive/unattended, Thermotherapy (hot pack), and Cryotherapy (cold pack).        Visit Frequency: 2 times Per Week for 8 Weeks.       This plan was discussed with Patient.   Discussion participants: Agreed Upon Plan of Care  Plan details: Frequency and duration of treatment to be adjusted as needed, possible transfer to closer location depending on insurance coverage          Patient's spiritual, cultural, and educational needs considered and patient agreeable to plan of care and goals.     Education  Education was done with Patient. The patient's learning style includes Demonstration, Listening, and Pictures/video. The patient Demonstrates understanding and Verbalizes  understanding.         Educated in home exercise program.       Goals:   Active       ADLs       patient will be able to dress and bathe independently       Start:  05/07/25    Expected End:  06/06/25               Functional outcome       Patient will show a significant change in NDI patient-reported outcome tool to demonstrate subjective improvement       Start:  05/07/25    Expected End:  07/04/25            Patient will demonstrate independence in home program for support of progression       Start:  05/07/25    Expected End:  06/06/25               Pain       Patient will report pain of 4/10 demonstrating a reduction of overall pain       Start:  05/07/25    Expected End:  07/04/25               Range of Motion       Patient will achieve bilateral cervical rotation ROM 45+ degrees       Start:  05/07/25    Expected End:  07/04/25            Patient will achieve bilateral shoulder flexion of 150 degrees       Start:  05/07/25    Expected End:  06/06/25               Strength       Patient will achieve bilateral shoulder flexion strength of 5/5       Start:  05/07/25    Expected End:  07/04/25            Patient will achieve bilateral shoulder abduction strength of 5/5       Start:  05/07/25    Expected End:  07/04/25                So Harrison, PT, DPT

## 2025-05-07 ENCOUNTER — OFFICE VISIT (OUTPATIENT)
Dept: PRIMARY CARE CLINIC | Facility: CLINIC | Age: 60
End: 2025-05-07
Payer: MEDICAID

## 2025-05-07 VITALS
OXYGEN SATURATION: 97 % | DIASTOLIC BLOOD PRESSURE: 71 MMHG | BODY MASS INDEX: 31.2 KG/M2 | SYSTOLIC BLOOD PRESSURE: 118 MMHG | HEIGHT: 67 IN | TEMPERATURE: 99 F | HEART RATE: 93 BPM

## 2025-05-07 DIAGNOSIS — M79.605 BILATERAL LEG PAIN: Primary | ICD-10-CM

## 2025-05-07 DIAGNOSIS — I10 PRIMARY HYPERTENSION: ICD-10-CM

## 2025-05-07 DIAGNOSIS — M79.604 BILATERAL LEG PAIN: Primary | ICD-10-CM

## 2025-05-07 DIAGNOSIS — E11.00 TYPE 2 DIABETES MELLITUS WITH HYPEROSMOLARITY WITHOUT COMA, WITHOUT LONG-TERM CURRENT USE OF INSULIN: ICD-10-CM

## 2025-05-07 DIAGNOSIS — G95.9 CERVICAL MYELOPATHY: ICD-10-CM

## 2025-05-07 DIAGNOSIS — E78.49 OTHER HYPERLIPIDEMIA: ICD-10-CM

## 2025-05-07 PROCEDURE — 99999 PR PBB SHADOW E&M-EST. PATIENT-LVL III: CPT | Mod: PBBFAC,,,

## 2025-05-07 PROCEDURE — 99213 OFFICE O/P EST LOW 20 MIN: CPT | Mod: GE,S$PBB,,

## 2025-05-07 PROCEDURE — 3078F DIAST BP <80 MM HG: CPT | Mod: CPTII,,,

## 2025-05-07 PROCEDURE — G2211 COMPLEX E/M VISIT ADD ON: HCPCS | Mod: S$PBB,,,

## 2025-05-07 PROCEDURE — 3008F BODY MASS INDEX DOCD: CPT | Mod: CPTII,,,

## 2025-05-07 PROCEDURE — 3074F SYST BP LT 130 MM HG: CPT | Mod: CPTII,,,

## 2025-05-07 PROCEDURE — 4010F ACE/ARB THERAPY RXD/TAKEN: CPT | Mod: CPTII,,,

## 2025-05-07 PROCEDURE — 99213 OFFICE O/P EST LOW 20 MIN: CPT | Mod: PBBFAC,PN

## 2025-05-07 PROCEDURE — 3044F HG A1C LEVEL LT 7.0%: CPT | Mod: CPTII,,,

## 2025-05-07 NOTE — PROGRESS NOTES
\[facility]\"" Family Medicine    Subjective:     Jerson Isabel is a 59 y.o. year old male with PMHx of T2DM, HTN, HLD, recent C4-C7 ACDF who presents to clinic for follow up    #neck and back pain  Patient with a PMHx of C4-C7 ACDF who is presenting with back and neck pain. He reports that after his surgery he had relief from his back and neck pain for a while but it has since returned. He denies numbness and tingling in his fingers and hands, he reports that he used to have radiating numbness/tingling from his neck to his hands which have resolved. He reports that he has not started PT/OT yet and he only occasionally uses tylenol 500 every once in a while for pain relief. Discussed with patient that he should get some relief with working with PT/OT and he can increase his tylenol usage to 750-1000mg BID or TID. Patient agreed with plan. Scheduled to see neurosurgery on 5/27/25    #bilateral leg pain  Patient reports years history of bilateral foot tingling, and bilateral leg pain. Worse with activity especially after rest. Not associated with the time he started a statin.     #HTN  History of hypertension, blood pressure 118/71 today appears well controlled.     #DM  Patient with history of DM, last A1c of 6.7 which improved from previous reading of 8.7. CGM read during visit which showed moring BGs in 100s.     Patient Active Problem List    Diagnosis Date Noted    Decreased ROM of neck 05/06/2025    Decreased range of motion (ROM) of shoulder 05/06/2025    Decreased strength of upper extremity 05/06/2025    Cervical myelopathy 03/14/2025    HLD (hyperlipidemia) 03/14/2025    Pain 03/14/2025    Diabetes mellitus, type II 09/17/2015    Cervical radiculopathy 09/17/2015    HTN (hypertension) 09/24/2014    Right knee pain 08/25/2014        Review of patient's allergies indicates:  No Known Allergies     Past Medical History:   Diagnosis Date    Chronic kidney disease     Diabetes mellitus, type 2     Hypertension       Past  "Surgical History:   Procedure Laterality Date    DECOMPRESSION OF CERVICAL SPINE BY ANTERIOR APPROACH WITH FUSION N/A 3/12/2025    Procedure: C4-7 ACDF;  Surgeon: Boogie Long DO;  Location: [facility] OR [address] [place] [address];  Service: Neurosurgery;  Laterality: N/A;      Family History   Problem Relation Name Age of Onset    Heart attack Mother      Hypertension Mother      Heart attack Father      Hypertension Father      Diabetes Sister      No Known Problems Brother      No Known Problems Maternal Aunt      No Known Problems Maternal Uncle      No Known Problems Paternal Aunt      No Known Problems Paternal Uncle      No Known Problems Maternal Grandmother      No Known Problems Maternal Grandfather      No Known Problems Paternal Grandmother      No Known Problems Paternal Grandfather      Amblyopia Neg Hx      Blindness Neg Hx      Cancer Neg Hx      Cataracts Neg Hx      Glaucoma Neg Hx      Macular degeneration Neg Hx      Retinal detachment Neg Hx      Strabismus Neg Hx      Stroke Neg Hx      Thyroid disease Neg Hx        Social History     Tobacco Use    Smoking status: Never    Smokeless tobacco: Never   Substance Use Topics    Alcohol use: No        Objective:     Vitals:    05/07/25 1304   BP: 118/71   BP Location: Left arm   Patient Position: Sitting   Pulse: 93   Temp: 98.5 °F (36.9 °C)   TempSrc: Oral   SpO2: 97%   Height: 5' 7" (1.702 m)     BMI: Body mass index is 31.2 kg/m².    Physical Exam  Constitutional:       Appearance: Normal appearance.   HENT:      Head: Normocephalic and atraumatic.      Mouth/Throat:      Mouth: Mucous membranes are moist.      Pharynx: Oropharynx is clear.   Eyes:      Extraocular Movements: Extraocular movements intact.      Conjunctiva/sclera: Conjunctivae normal.      Pupils: Pupils are equal, round, and reactive to light.   Neck:      Comments: Neck brace in place  Cardiovascular:      Rate and Rhythm: Normal rate and regular rhythm.      Pulses: Normal pulses.      Heart " sounds: Normal heart sounds.   Pulmonary:      Effort: Pulmonary effort is normal.      Breath sounds: Normal breath sounds.   Abdominal:      General: Abdomen is flat.      Palpations: Abdomen is soft.   Musculoskeletal:         General: Normal range of motion.      Cervical back: Normal range of motion and neck supple.   Skin:     General: Skin is warm and dry.      Capillary Refill: Capillary refill takes less than 2 seconds.   Neurological:      General: No focal deficit present.      Mental Status: He is alert and oriented to person, place, and time.   Psychiatric:         Mood and Affect: Mood normal.         Behavior: Behavior normal.         Thought Content: Thought content normal.          Assessment/Plan:     Jerson Isabel is a 59 y.o. year old male who presents to clinic for follow up    1. Bilateral leg pain (Primary)  Patient presenting with years of bilateral leg pain, worse with movement especially getting up from resting position. Patient has several risk factors of being a vasculopath including previous history of diabetes, HLD. Will get BETO to screen for PAD.  - US Ankle Brachial Indices Ext LTD WO Str; Future    2. Type 2 diabetes mellitus with hyperosmolarity without coma, without long-term current use of insulin  History of diabetes, chronic and controlled. Last A1c of 6.7, continue current medications.    3. Other hyperlipidemia  History of HLD, chronic and controlled on last lipid panel. Continue current medications.    4. Primary hypertension  History of HTN, chronic and controlled with last BP of 118/71. Continue current medications.    5. Cervical myelopathy  Patient with history of C4-C7 ACDF s/p surgery with neurosurgery. Patient reporting neck and back pain, but only taking tylenol 500 occasionally. Discussed with patient he should follow up with physical therapy and can increase his tylenol use to 750-1000mg BID or TID for relief. Can follow up if still not resolved.       Follow-up:3  months or PRN. Back/neck pain    A total of 20 minutes was spent on patient care during this encounter which included chart review, examining the patient, formulating a treatment plan and documentation.       Adrian Savage MD  [facility], PGY-2  05/07/2025

## 2025-05-08 ENCOUNTER — HOSPITAL ENCOUNTER (OUTPATIENT)
Dept: RADIOLOGY | Facility: HOSPITAL | Age: 60
Discharge: HOME | End: 2025-05-08
Payer: MEDICAID

## 2025-05-08 DIAGNOSIS — M79.605 BILATERAL LEG PAIN: ICD-10-CM

## 2025-05-08 DIAGNOSIS — M79.604 BILATERAL LEG PAIN: ICD-10-CM

## 2025-05-08 PROCEDURE — 93922 UPR/L XTREMITY ART 2 LEVELS: CPT | Mod: TC,PO

## 2025-05-08 PROCEDURE — 93922 UPR/L XTREMITY ART 2 LEVELS: CPT | Mod: 26,,, | Performed by: RADIOLOGY

## 2025-05-15 ENCOUNTER — CLINICAL SUPPORT (OUTPATIENT)
Dept: REHABILITATION | Facility: HOSPITAL | Age: 60
End: 2025-05-15
Payer: MEDICAID

## 2025-05-15 DIAGNOSIS — R29.898 DECREASED ROM OF NECK: Primary | ICD-10-CM

## 2025-05-15 DIAGNOSIS — M25.619 DECREASED RANGE OF MOTION OF SHOULDER, UNSPECIFIED LATERALITY: ICD-10-CM

## 2025-05-15 DIAGNOSIS — R29.898 DECREASED STRENGTH OF UPPER EXTREMITY: ICD-10-CM

## 2025-05-15 PROCEDURE — 97110 THERAPEUTIC EXERCISES: CPT | Mod: PN | Performed by: PHYSICAL THERAPIST

## 2025-05-15 NOTE — PROGRESS NOTES
Outpatient Rehab    Physical Therapy Visit    Patient Name: Jerson Isabel  MRN: [id number]  YOB: 1965  Encounter Date: 5/15/2025    Therapy Diagnosis:   Encounter Diagnoses   Name Primary?    Decreased ROM of neck Yes    Decreased range of motion of shoulder, unspecified laterality     Decreased strength of upper extremity      Physician: Brit Fernandez NP    Physician Orders: Eval and Treat  Medical Diagnosis: Spinal stenosis, cervical region  S/P cervical spinal fusion  Shoulder pain, unspecified chronicity, unspecified laterality    Visit # / Visits Authorized:  1 / 10  Insurance Authorization Period: 5/6/2025 to 7/14/2025  Date of Evaluation: 5/6/2025  Plan of Care Certification: 5/7/2025 to 7/4/2025      PT/PTA:     Number of PTA visits since last PT visit:   Time In: 1000   Time Out: 1048  Total Time (in minutes): 48   Total Billable Time (in minutes): 48    FOTO:  Intake Score:  %  Survey Score 2:  %  Survey Score 3:  %    Precautions:       Subjective   overall feeling much better since his surgery. He did well with HEP provided by other therapist. Does have intermittent pain in shoulders and behind the neck.  Pain reported as 8/10. neck and shoulders, right side >    Objective            Treatment:     Jerson received therapeutic exercises to develop strength, endurance, ROM, flexibility, posture, and core stabilization for (12) minutes including:  UBE forward and back 3/3 6 min  Dowel press x30 reps 3#  Dowel flexion x30 reps 3#    Jerson participated in neuromuscular re-education activities to improve: Balance, Coordination, Kinesthetic, Sense, Proprioception, and Posture for (26) minutes. The following activities were included:  Seated cervical retractions x30 reps  Seated scap squeeze x30 reps  Cervical isometrics x20 reps 4 way  Supine chin tucks into towel x30 reps  Supine cervical rotation on towel 2 min  Bilateral ER 2x10 reps YTB    Jerson participated in dynamic functional  therapeutic activities to improve functional performance for (10) minutes, including:  Gripping 50# 2 min each  Straight arm lat pull down 2x10 reps 13#  Resisted rows 2x10 reps 13#      Time Entry(in minutes):       Assessment & Plan   Assessment: Pt with good tolerance of all light strengthening, postural correction, and mobility exercises.  Evaluation/Treatment Tolerance: Patient tolerated treatment well    Patient will continue to benefit from skilled outpatient physical therapy to address the deficits listed in the problem list box on initial evaluation, provide pt/family education and to maximize pt's level of independence in the home and community environment.     Patient's spiritual, cultural, and educational needs considered and patient agreeable to plan of care and goals.           Plan: Continue with PT POC    Goals:   Active       ADLs       patient will be able to dress and bathe independently       Start:  05/07/25    Expected End:  06/06/25               Functional outcome       Patient will show a significant change in NDI patient-reported outcome tool to demonstrate subjective improvement       Start:  05/07/25    Expected End:  07/04/25            Patient will demonstrate independence in home program for support of progression       Start:  05/07/25    Expected End:  06/06/25               Pain       Patient will report pain of 4/10 demonstrating a reduction of overall pain       Start:  05/07/25    Expected End:  07/04/25               Range of Motion       Patient will achieve bilateral cervical rotation ROM 45+ degrees       Start:  05/07/25    Expected End:  07/04/25            Patient will achieve bilateral shoulder flexion of 150 degrees       Start:  05/07/25    Expected End:  06/06/25               Strength       Patient will achieve bilateral shoulder flexion strength of 5/5       Start:  05/07/25    Expected End:  07/04/25            Patient will achieve bilateral shoulder abduction  strength of 5/5       Start:  05/07/25    Expected End:  07/04/25                Alexander Odom PT, DPT

## 2025-05-19 ENCOUNTER — CLINICAL SUPPORT (OUTPATIENT)
Dept: REHABILITATION | Facility: HOSPITAL | Age: 60
End: 2025-05-19
Payer: MEDICAID

## 2025-05-19 DIAGNOSIS — M25.619 DECREASED RANGE OF MOTION OF SHOULDER, UNSPECIFIED LATERALITY: ICD-10-CM

## 2025-05-19 DIAGNOSIS — R29.898 DECREASED STRENGTH OF UPPER EXTREMITY: ICD-10-CM

## 2025-05-19 DIAGNOSIS — R29.898 DECREASED ROM OF NECK: Primary | ICD-10-CM

## 2025-05-19 PROCEDURE — 97110 THERAPEUTIC EXERCISES: CPT | Mod: PN | Performed by: PHYSICAL THERAPIST

## 2025-05-19 NOTE — PROGRESS NOTES
Outpatient Rehab    Physical Therapy Visit    Patient Name: Jerson Isabel  MRN: [id number]  YOB: 1965  Encounter Date: 5/19/2025    Therapy Diagnosis:   Encounter Diagnoses   Name Primary?    Decreased ROM of neck Yes    Decreased range of motion of shoulder, unspecified laterality     Decreased strength of upper extremity      Physician: Brit Fernandez NP    Physician Orders: Eval and Treat  Medical Diagnosis: Spinal stenosis, cervical region  S/P cervical spinal fusion  Shoulder pain, unspecified chronicity, unspecified laterality    Visit # / Visits Authorized:  2 / 10  Insurance Authorization Period: 5/6/2025 to 7/14/2025  Date of Evaluation: 5/6/2025  Plan of Care Certification: 5/7/2025 to 7/4/2025      PT/PTA:     Number of PTA visits since last PT visit:   Time In: 1005   Time Out: 1055  Total Time (in minutes): 50   Total Billable Time (in minutes): 50    FOTO:  Intake Score:  %  Survey Score 2:  %  Survey Score 3:  %    Precautions:       Subjective   Felt fine after first follow up session. No new complaints today.  Pain reported as 6/10. neck and shoulders    Objective            Treatment:     Jerson received therapeutic exercises to develop strength, endurance, ROM, flexibility, posture, and core stabilization for (10) minutes including:  UBE forward and back 3/3 6 min  Dowel press x30 reps 3#  Dowel flexion x30 reps 3#     Jerson participated in neuromuscular re-education activities to improve: Balance, Coordination, Kinesthetic, Sense, Proprioception, and Posture for (25) minutes. The following activities were included:  Seated cervical retractions x30 reps  Seated scap squeeze x30 reps  Cervical isometrics x30 reps 4 way  Supine chin tucks into towel x30 reps  Supine cervical rotation on towel 2 min  Bilateral ER 2x10 reps RTB  Horizontal abduction 2x10 reps RTB     Jerson participated in dynamic functional therapeutic activities to improve functional performance for (10) minutes,  including:  Gripping 50# 2 min each  Straight arm lat pull down 2x10 reps 13#  Resisted rows 2x10 reps 13#    Jerson received cold pack for (5) minutes to neck and shoulders.    *A portion of this treatment session is provided with the assistance of a skilled rehbailitation technician under the supervision of a licensed physical therapist        Time Entry(in minutes):  Therapeutic Exercise Time Entry: 45    Assessment & Plan   Assessment: Pt continues to benefit from strengthening to improve stabiliy of posture and reduce symtpoms in neck following surgery.  Evaluation/Treatment Tolerance: Patient tolerated treatment well    Patient will continue to benefit from skilled outpatient physical therapy to address the deficits listed in the problem list box on initial evaluation, provide pt/family education and to maximize pt's level of independence in the home and community environment.     Patient's spiritual, cultural, and educational needs considered and patient agreeable to plan of care and goals.           Plan: Continue with PT POC    Goals:   Active       ADLs       patient will be able to dress and bathe independently       Start:  05/07/25    Expected End:  06/06/25               Functional outcome       Patient will show a significant change in NDI patient-reported outcome tool to demonstrate subjective improvement       Start:  05/07/25    Expected End:  07/04/25            Patient will demonstrate independence in home program for support of progression       Start:  05/07/25    Expected End:  06/06/25               Pain       Patient will report pain of 4/10 demonstrating a reduction of overall pain       Start:  05/07/25    Expected End:  07/04/25               Range of Motion       Patient will achieve bilateral cervical rotation ROM 45+ degrees       Start:  05/07/25    Expected End:  07/04/25            Patient will achieve bilateral shoulder flexion of 150 degrees       Start:  05/07/25    Expected End:   06/06/25               Strength       Patient will achieve bilateral shoulder flexion strength of 5/5       Start:  05/07/25    Expected End:  07/04/25            Patient will achieve bilateral shoulder abduction strength of 5/5       Start:  05/07/25    Expected End:  07/04/25                Alexander Odom PT, DPT

## 2025-05-23 ENCOUNTER — CLINICAL SUPPORT (OUTPATIENT)
Dept: REHABILITATION | Facility: HOSPITAL | Age: 60
End: 2025-05-23
Payer: MEDICAID

## 2025-05-23 DIAGNOSIS — R29.898 DECREASED STRENGTH OF UPPER EXTREMITY: ICD-10-CM

## 2025-05-23 DIAGNOSIS — M25.619 DECREASED RANGE OF MOTION OF SHOULDER, UNSPECIFIED LATERALITY: ICD-10-CM

## 2025-05-23 DIAGNOSIS — R29.898 DECREASED ROM OF NECK: Primary | ICD-10-CM

## 2025-05-23 PROCEDURE — 97110 THERAPEUTIC EXERCISES: CPT | Mod: PN

## 2025-05-23 NOTE — PROGRESS NOTES
Outpatient Rehab  Physical Therapy Visit    Patient Name: Jerson Isabel  MRN: [id number]  YOB: 1965  Encounter Date: 5/23/2025    Therapy Diagnosis:   Encounter Diagnoses   Name Primary?    Decreased ROM of neck Yes    Decreased range of motion of shoulder, unspecified laterality     Decreased strength of upper extremity      Physician: Brit Fernandez NP    Physician Orders: Eval and Treat  Medical Diagnosis: Spinal stenosis, cervical region  S/P cervical spinal fusion  Shoulder pain, unspecified chronicity, unspecified laterality  Visit # / Visits Authorized:  3 / 10  Insurance Authorization Period: 5/6/2025 to 7/14/2025  Date of Evaluation: 5/6/2025  Plan of Care Certification: 5/7/2025 to 7/4/2025      PT/PTA: PT   Number of PTA visits since last PT visit:0  Time In: 0900   Time Out: 0958  Total Time (in minutes): 58   Total Billable Time (in minutes): 53    FOTO:  Intake Score: 40%  Survey Score 2:  %  Survey Score 3:  %    Precautions:     ACDF precautions      Subjective   Patient did okay after last session. Having a little more pain this morning though..  Pain reported as 8/10. neck and shoulders    Objective            Treatment:  Therapeutic Exercise  TE 1: UBE x3 min each (foward and backward)  TE 2: Supine Dowel Press x30 reps + #3 bar  TE 3: Supine Shoulder Flexion x30 reps + #3 bar  Balance/Neuromuscular Re-Education  NMR 1: Supine Chin Tucks into towel x30 reps  NMR 2: Seated Scap Retractions x30 reps (3s holds)  NMR 3: Seated Cervical Retractions x30 reps  NMR 4: Supine Cervical Rotation on towel x2 min each direction (rotation, up/down)  NMR 5: Seated B Shoulder ER 2x10 reps + RTB  NMR 6: Seated Horizontal Abduction 2x10 reps + RTB  NMR 7: Standing Lateral Scap Taps 2x10 reps each side + YTB  Therapeutic Activity  TA 1: Seated Gripper #50 x2 min each  TA 2: Straight Arm Lat Pull Down 3x10 reps #13 CC Machine  TA 3: Resisted Shoulder Rows 3x10 reps + #13 CC  Machine  Modalities  Cryotherapy (Minutes\Location): x5 minutes to the neck and shoulder    *A portion of this treatment session is provided with the assistance of a skilled rehbailitation technician under the supervision of a licensed physical therapist.    Time Entry(in minutes):  Hot/Cold Pack Time Entry: 5 (cold pack)  Neuromuscular Re-Education Time Entry: 30  Therapeutic Activity Time Entry: 11  Therapeutic Exercise Time Entry: 12    Assessment & Plan   Assessment: Patient continues to benefit from UE stengthening and light cervical ROM exercises combined with posture and periscapular stability. Emphasis on preventing exacerbation of symptoms.  Evaluation/Treatment Tolerance: Patient tolerated treatment well    The patient will continue to benefit from skilled outpatient physical therapy in order to address the deficits listed in the problem list on the initial evaluation, provide patient and family education, and maximize the patients level of independence in the home and community environments.     The patient's spiritual, cultural, and educational needs were considered, and the patient is agreeable to the plan of care and goals.     Education  Education was done with Patient. The patient's learning style includes Demonstration and Listening. The patient Demonstrates understanding and Verbalizes understanding.               Plan: Continue with PT POC    Goals:   Active       ADLs       patient will be able to dress and bathe independently       Start:  05/07/25    Expected End:  06/06/25               Functional outcome       Patient will show a significant change in NDI patient-reported outcome tool to demonstrate subjective improvement       Start:  05/07/25    Expected End:  07/04/25            Patient will demonstrate independence in home program for support of progression       Start:  05/07/25    Expected End:  06/06/25               Pain       Patient will report pain of 4/10 demonstrating a  reduction of overall pain       Start:  05/07/25    Expected End:  07/04/25               Range of Motion       Patient will achieve bilateral cervical rotation ROM 45+ degrees       Start:  05/07/25    Expected End:  07/04/25            Patient will achieve bilateral shoulder flexion of 150 degrees       Start:  05/07/25    Expected End:  06/06/25               Strength       Patient will achieve bilateral shoulder flexion strength of 5/5       Start:  05/07/25    Expected End:  07/04/25            Patient will achieve bilateral shoulder abduction strength of 5/5       Start:  05/07/25    Expected End:  07/04/25              Claudia Linn PT, DPT

## 2025-05-27 ENCOUNTER — OFFICE VISIT (OUTPATIENT)
Dept: NEUROSURGERY | Facility: CLINIC | Age: 60
End: 2025-05-27
Payer: MEDICAID

## 2025-05-27 ENCOUNTER — HOSPITAL ENCOUNTER (OUTPATIENT)
Dept: RADIOLOGY | Facility: HOSPITAL | Age: 60
Discharge: HOME | End: 2025-05-27
Attending: NURSE PRACTITIONER
Payer: MEDICAID

## 2025-05-27 DIAGNOSIS — Z98.1 S/P CERVICAL SPINAL FUSION: ICD-10-CM

## 2025-05-27 DIAGNOSIS — Z98.1 S/P CERVICAL SPINAL FUSION: Primary | ICD-10-CM

## 2025-05-27 DIAGNOSIS — M48.02 SPINAL STENOSIS, CERVICAL REGION: ICD-10-CM

## 2025-05-27 PROCEDURE — 72125 CT NECK SPINE W/O DYE: CPT | Mod: TC

## 2025-05-27 PROCEDURE — 99999 PR PBB SHADOW E&M-EST. PATIENT-LVL III: CPT | Mod: PBBFAC,,, | Performed by: STUDENT IN AN ORGANIZED HEALTH CARE EDUCATION/TRAINING PROGRAM

## 2025-05-27 PROCEDURE — 72125 CT NECK SPINE W/O DYE: CPT | Mod: 26,,, | Performed by: RADIOLOGY

## 2025-05-27 PROCEDURE — 99213 OFFICE O/P EST LOW 20 MIN: CPT | Mod: PBBFAC,25 | Performed by: STUDENT IN AN ORGANIZED HEALTH CARE EDUCATION/TRAINING PROGRAM

## 2025-05-27 NOTE — PROGRESS NOTES
Neurosurgery  Established Patient    SUBJECTIVE:     History of Present Illness:  Jerson Isabel is a 59 y.o. male s/p ACD C4-7 with Dr. Long on 3/12/25. He is being seen in clinic today for his 6 week post-op evaluation. States that he is doing well since surgery with improvement in his neck and arm pain as well as numbness and tingling. Reports some pain in the shoulders and should blade. Denies fever, chills, or new neurological deficits. States that with swallowing certain foods/pills that it will get stuck but is then able to move the object with more water. He has been compliant with the cervical collar and bone stimulator.     Interval fu 5/27/25:  Pt presents in fu.  Overall, he is doing very well and has resolution of the numbness in his arms/hands he had preop.  He has no radicular arm pain.  His only complaint is that in the am when he first wakes up he has posterior neck pain which improves after he gets up and moves around.  He also notices this at night when he wakes from sleep.  He has just started PT for this.  He has been compliant with the bone growth stimulator.    Review of patient's allergies indicates:  No Known Allergies    Current Medications[1]    Past Medical History:   Diagnosis Date    Chronic kidney disease     Diabetes mellitus, type 2     Hypertension      Past Surgical History:   Procedure Laterality Date    DECOMPRESSION OF CERVICAL SPINE BY ANTERIOR APPROACH WITH FUSION N/A 3/12/2025    Procedure: C4-7 ACDF;  Surgeon: Boogie Long DO;  Location: [facility] OR [address] [place] [address];  Service: Neurosurgery;  Laterality: N/A;     Family History       Problem Relation (Age of Onset)    Diabetes Sister    Heart attack Mother, Father    Hypertension Mother, Father    No Known Problems Brother, Maternal Aunt, Maternal Uncle, Paternal Aunt, Paternal Uncle, Maternal Grandmother, Maternal Grandfather, Paternal Grandmother, Paternal Grandfather          Social History     Socioeconomic History    Marital  status:    Occupational History     Employer: [employer]   Tobacco Use    Smoking status: Never    Smokeless tobacco: Never   Substance and Sexual Activity    Alcohol use: No    Drug use: No    Sexual activity: Yes     Partners: Female     Social Drivers of Health     Financial Resource Strain: Low Risk  (4/22/2025)    Overall Financial Resource Strain (CARDIA)     Difficulty of Paying Living Expenses: Not hard at all   Food Insecurity: No Food Insecurity (4/22/2025)    Hunger Vital Sign     Worried About Running Out of Food in the Last Year: Never true     Ran Out of Food in the Last Year: Never true   Transportation Needs: No Transportation Needs (4/22/2025)    PRAPARE - Transportation     Lack of Transportation (Medical): No     Lack of Transportation (Non-Medical): No   Physical Activity: Insufficiently Active (4/22/2025)    Exercise Vital Sign     Days of Exercise per Week: 3 days     Minutes of Exercise per Session: 10 min   Stress: Stress Concern Present (4/22/2025)    [employer] - Occupational Stress Questionnaire     Feeling of Stress : To some extent   Housing Stability: Low Risk  (4/22/2025)    Housing Stability Vital Sign     Unable to Pay for Housing in the Last Year: No     Number of Times Moved in the Last Year: 1     Homeless in the Last Year: No       Review of Systems  14 point ROS was negative    OBJECTIVE:     Vital Signs  Pain Score:   3  There is no height or weight on file to calculate BMI.    Physical Exam:    Constitutional: He appears well-developed and well-nourished.     Eyes: Pupils are equal, round, and reactive to light.     Cardiovascular: Normal rate and regular rhythm.     Abdominal: Soft.     Psych/Behavior: He is alert. He is oriented to person, place, and time. He has a normal mood and affect.     Musculoskeletal: Gait is normal.        Neck: Range of motion is limited.        Back: Range of motion is limited.        Right Upper Extremities: Muscle  strength is 5/5.        Left Upper Extremities: Muscle strength is 5/5.       Right Lower Extremities: Muscle strength is 5/5.        Left Lower Extremities: Muscle strength is 5/5.     Neurological:        Sensory: There is no sensory deficit in the trunk. There is no sensory deficit in the extremities.        DTRs: DTRs are DTRS NORMAL AND SYMMETRICnormal and symmetric.        Cranial nerves: Cranial nerve(s) II, III, IV, V, VI, VII, VIII, IX, X, XI and XII are intact.     No pierce's bilaterally    Right anterior cervical incision well healed.    Diagnostic Results:  CT c spine: s/p C4-7 ACDF.  No loosening of hardware.  Early interbody fusion present from C3-7.  Good alignment.  Reviewed    ASSESSMENT/PLAN:     59 M s/p C4-7 ACDF on 3/12/25 for cervical radiculopathy.  He is doing well postop with resolution of his preop numbness and radicular pain.  He is intact.  I think that PT will benefit his posterior neck pain which seems to be mild to moderate.  His CT c spine looks great.  Will plan to get CT c spine and flex/ex c spine in 6 mo.  He has no restrictions and should continue the bone growth stimulator.             [1]   Current Outpatient Medications   Medication Sig Dispense Refill    amLODIPine (NORVASC) 5 MG tablet Take 1 tablet (5 mg total) by mouth once daily. 90 tablet 3    aspirin 81 MG Chew Take 81 mg by mouth once daily.      atorvastatin (LIPITOR) 10 MG tablet Take 1 tablet (10 mg total) by mouth once daily. 90 tablet 3    dapagliflozin propanediol (FARXIGA) 10 mg tablet Take 1 tablet (10 mg total) by mouth once daily. 30 tablet 5    erythromycin (ROMYCIN) ophthalmic ointment Place a 1/2 inch ribbon of ointment into the lower eyelid TID for 7 days 1 Tube 0    gemfibroziL (LOPID) 600 MG tablet Take 1 tablet (600 mg total) by mouth 2 (two) times daily before meals. 180 tablet 3    glipiZIDE (GLUCOTROL) 10 MG tablet Take 1 tablet (10 mg total) by mouth 2 (two) times daily with meals. 60 tablet 5     GLUCOSAM/MSM/CHONDR/VIT C/HYAL (GLUCOSAMINE-CHONDROITIN-MSM ORAL) Take 1 tablet by mouth 2 (two) times daily.      hydroCHLOROthiazide 12.5 MG Tab Take 1 tablet (12.5 mg total) by mouth once daily. 30 tablet 5    lisinopriL 10 MG tablet Take 1 tablet (10 mg total) by mouth once daily. 30 tablet 5    metFORMIN (GLUCOPHAGE) 1000 MG tablet Take 1 tablet (1,000 mg total) by mouth 2 (two) times daily with meals. 180 tablet 3    ondansetron (ZOFRAN-ODT) 4 MG TbDL Take 1 tablet (4 mg total) by mouth every 8 (eight) hours as needed (nausea/vomiting). 12 tablet 0    oxyCODONE-acetaminophen (PERCOCET) 5-325 mg per tablet Take 1 tablet by mouth every 8 (eight) hours as needed for Pain. 21 tablet 0    semaglutide (OZEMPIC) 0.25 mg or 0.5 mg (2 mg/3 mL) pen injector Inject 0.5 mg into the skin every 7 days. 6 mL 1    aluminum & magnesium hydroxide-simethicone (MAALOX MAXIMUM STRENGTH) 400-400-40 mg/5 mL suspension Take 15 mLs by mouth every 6 (six) hours as needed for Indigestion.  0    famotidine (PEPCID) 20 MG tablet Take 1 tablet (20 mg total) by mouth 2 (two) times daily. 20 tablet 0     No current facility-administered medications for this visit.

## 2025-05-30 ENCOUNTER — CLINICAL SUPPORT (OUTPATIENT)
Dept: REHABILITATION | Facility: HOSPITAL | Age: 60
End: 2025-05-30
Payer: MEDICAID

## 2025-05-30 DIAGNOSIS — R29.898 DECREASED ROM OF NECK: Primary | ICD-10-CM

## 2025-05-30 DIAGNOSIS — M25.619 DECREASED RANGE OF MOTION OF SHOULDER, UNSPECIFIED LATERALITY: ICD-10-CM

## 2025-05-30 DIAGNOSIS — R29.898 DECREASED STRENGTH OF UPPER EXTREMITY: ICD-10-CM

## 2025-05-30 PROCEDURE — 97110 THERAPEUTIC EXERCISES: CPT | Mod: PN | Performed by: PHYSICAL THERAPIST

## 2025-05-30 NOTE — PROGRESS NOTES
Outpatient Rehab    Physical Therapy Visit    Patient Name: Jerson Isabel  MRN: [id number]  YOB: 1965  Encounter Date: 5/30/2025    Therapy Diagnosis:   Encounter Diagnoses   Name Primary?    Decreased ROM of neck Yes    Decreased range of motion of shoulder, unspecified laterality     Decreased strength of upper extremity      Physician: Brit Fernandez NP    Physician Orders: Eval and Treat  Medical Diagnosis: Spinal stenosis, cervical region  S/P cervical spinal fusion  Shoulder pain, unspecified chronicity, unspecified laterality    Visit # / Visits Authorized:  4 / 10  Insurance Authorization Period: 5/6/2025 to 7/14/2025  Date of Evaluation: 5/6/2025  Plan of Care Certification: 5/7/2025 to 7/4/2025      PT/PTA:     Number of PTA visits since last PT visit:   Time In: 1002   Time Out: 1056  Total Time (in minutes): 54   Total Billable Time (in minutes): 54    FOTO:  Intake Score:  %  Survey Score 2:  %  Survey Score 3:  %    Precautions:       Subjective   PT reports doing well. Just some neck pain in the morning.  Pain reported as 2/10. neck and shoulders    Objective            Treatment:   Jerson received therapeutic exercises to develop strength, endurance, ROM, flexibility, posture, and core stabilization for (10) minutes including:  UBE forward and back 3/3 6 min  Dowel press x30 reps 3#  Dowel flexion x30 reps 3#     Jerson participated in neuromuscular re-education activities to improve: Balance, Coordination, Kinesthetic, Sense, Proprioception, and Posture for (28) minutes. The following activities were included:  Seated cervical retractions x30 reps  Seated scap squeeze x30 reps  Cervical isometrics x30 reps 4 way  Supine chin tucks into towel x30 reps  Supine cervical rotation on towel 2 min  Bilateral ER 2x10 reps RTB  Horizontal abduction 2x10 reps RTB     Jerson participated in dynamic functional therapeutic activities to improve functional performance for (10) minutes,  including:  Gripping 50# 2 min each  Straight arm lat pull down 2x10 reps 13#  Resisted rows 2x10 reps 13#     Jerson received cold pack for (5) minutes to neck and shoulders.     *A portion of this treatment session is provided with the assistance of a skilled rehbailitation technician under the supervision of a licensed physical therapist      Time Entry(in minutes):  Therapeutic Exercise Time Entry: 54    Assessment & Plan   Assessment:         The patient will continue to benefit from skilled outpatient physical therapy in order to address the deficits listed in the problem list on the initial evaluation, provide patient and family education, and maximize the patients level of independence in the home and community environments.     The patient's spiritual, cultural, and educational needs were considered, and the patient is agreeable to the plan of care and goals.           Plan:      Goals:   Active       ADLs       patient will be able to dress and bathe independently       Start:  05/07/25    Expected End:  06/06/25               Functional outcome       Patient will show a significant change in NDI patient-reported outcome tool to demonstrate subjective improvement       Start:  05/07/25    Expected End:  07/04/25            Patient will demonstrate independence in home program for support of progression       Start:  05/07/25    Expected End:  06/06/25               Pain       Patient will report pain of 4/10 demonstrating a reduction of overall pain       Start:  05/07/25    Expected End:  07/04/25               Range of Motion       Patient will achieve bilateral cervical rotation ROM 45+ degrees       Start:  05/07/25    Expected End:  07/04/25            Patient will achieve bilateral shoulder flexion of 150 degrees       Start:  05/07/25    Expected End:  06/06/25               Strength       Patient will achieve bilateral shoulder flexion strength of 5/5       Start:  05/07/25    Expected End:   07/04/25            Patient will achieve bilateral shoulder abduction strength of 5/5       Start:  05/07/25    Expected End:  07/04/25                Ned Fine PT, DPT

## 2025-06-04 ENCOUNTER — CLINICAL SUPPORT (OUTPATIENT)
Dept: REHABILITATION | Facility: HOSPITAL | Age: 60
End: 2025-06-04
Payer: MEDICAID

## 2025-06-04 DIAGNOSIS — M25.619 DECREASED RANGE OF MOTION OF SHOULDER, UNSPECIFIED LATERALITY: ICD-10-CM

## 2025-06-04 DIAGNOSIS — R29.898 DECREASED STRENGTH OF UPPER EXTREMITY: ICD-10-CM

## 2025-06-04 DIAGNOSIS — R29.898 DECREASED ROM OF NECK: Primary | ICD-10-CM

## 2025-06-04 PROCEDURE — 97110 THERAPEUTIC EXERCISES: CPT | Mod: PN | Performed by: PHYSICAL THERAPIST

## 2025-06-10 ENCOUNTER — CLINICAL SUPPORT (OUTPATIENT)
Dept: REHABILITATION | Facility: HOSPITAL | Age: 60
End: 2025-06-10
Attending: PHYSICAL THERAPIST
Payer: MEDICAID

## 2025-06-10 DIAGNOSIS — R29.898 DECREASED ROM OF NECK: Primary | ICD-10-CM

## 2025-06-10 DIAGNOSIS — R29.898 DECREASED STRENGTH OF UPPER EXTREMITY: ICD-10-CM

## 2025-06-10 DIAGNOSIS — M25.619 DECREASED RANGE OF MOTION OF SHOULDER, UNSPECIFIED LATERALITY: ICD-10-CM

## 2025-06-10 PROCEDURE — 97110 THERAPEUTIC EXERCISES: CPT | Mod: PN | Performed by: PHYSICAL THERAPIST

## 2025-06-13 ENCOUNTER — CLINICAL SUPPORT (OUTPATIENT)
Dept: REHABILITATION | Facility: HOSPITAL | Age: 60
End: 2025-06-13
Payer: MEDICAID

## 2025-06-13 DIAGNOSIS — R29.898 DECREASED ROM OF NECK: Primary | ICD-10-CM

## 2025-06-13 DIAGNOSIS — M25.619 DECREASED RANGE OF MOTION OF SHOULDER, UNSPECIFIED LATERALITY: ICD-10-CM

## 2025-06-13 DIAGNOSIS — R29.898 DECREASED STRENGTH OF UPPER EXTREMITY: ICD-10-CM

## 2025-06-13 PROCEDURE — 97110 THERAPEUTIC EXERCISES: CPT | Mod: PN

## 2025-06-13 NOTE — PROGRESS NOTES
Outpatient Rehab  Physical Therapy Visit    Patient Name: Jerson Isabel  MRN: [id number]  YOB: 1965  Encounter Date: 6/13/2025    Therapy Diagnosis:   Encounter Diagnoses   Name Primary?    Decreased ROM of neck Yes    Decreased range of motion of shoulder, unspecified laterality     Decreased strength of upper extremity      Physician: Brit Fernandez NP    Physician Orders: Eval and Treat  Medical Diagnosis: Spinal stenosis, cervical region  S/P cervical spinal fusion  Shoulder pain, unspecified chronicity, unspecified laterality  Surgical Diagnosis: s/p ACD C4-7 with Dr. Long on 3/12/25.   Surgical Date: 3/12/2025  Visit # / Visits Authorized:  7 / 10  Insurance Authorization Period: 5/6/2025 to 7/14/2025  Date of Evaluation: 5/6/2025  Plan of Care Certification: 5/7/2025 to 7/4/2025      PT/PTA: PT   Number of PTA visits since last PT visit:0  Time In: 1052 (patient arrived late)   Time Out: 1140  Total Time (in minutes): 48   Total Billable Time (in minutes): 38    FOTO:  Intake Score: 40%  Survey Score 2:  %  Survey Score 3:  %    Precautions:     ACDF precautions      Subjective   Patient did not sleep good last night and having a little more pain/discomfort..  Pain reported as 4/10. neck and shoulders    Objective            Treatment:  Therapeutic Exercise  TE 1: UBE x3 min each (foward and backward) Level 5  TE 2: Supine Cervical Rotation on towel x2 min  TE 3: Supine Chin Tucks into towel x30 reps  TE 4: Supine Bicep Curls 3x10 reps + #10 dowel  TE 5: Standing Shoulder Rows 3x10 reps + #20 CC Machine  TE 6: Straight Arm Lat Pull Down 3x10 reps + #17 CC Machine  TE 7: Seated Gripper x2 min each (#50)  TE 8: Seated Cervical Retractions x30 reps  TE 9: Seated Scap Retractions x30 reps  TE 10: Standing Cervical Isometrics - 4 way x30 reps each  Modalities  Cryotherapy (Minutes\Location): x10 minutes to the neck and shoulder  Electrical Stimulation (Parameters): x10 minutes IFC to the  cervical musculature post treatment session. Patient supine for comfort. Intensity increased to patient tolerance. No adverse reactions observed.  Other Activities  Activity 1: TE: Bilateral ER 2x10 reps + YTB  Activity 2: TE: Horizontal Shoulder Abduction 2x10 reps + RTB    Time Entry(in minutes):  E-Stim (Unattended) Time Entry: 10  Hot/Cold Pack Time Entry: 10  Therapeutic Exercise Time Entry: 38    Assessment & Plan   Assessment: Patient continues to progress well through therapy. No exacerbation of symptoms. Minimal to no compensation noted this date.  Evaluation/Treatment Tolerance: Patient tolerated treatment well    The patient will continue to benefit from skilled outpatient physical therapy in order to address the deficits listed in the problem list on the initial evaluation, provide patient and family education, and maximize the patients level of independence in the home and community environments.     The patient's spiritual, cultural, and educational needs were considered, and the patient is agreeable to the plan of care and goals.     Education  Education was done with Patient. The patient's learning style includes Demonstration and Listening. The patient Demonstrates understanding and Verbalizes understanding.               Plan: Continue with PT POC.    Goals:   Active       ADLs       patient will be able to dress and bathe independently       Start:  05/07/25    Expected End:  06/06/25               Functional outcome       Patient will show a significant change in NDI patient-reported outcome tool to demonstrate subjective improvement       Start:  05/07/25    Expected End:  07/04/25            Patient will demonstrate independence in home program for support of progression       Start:  05/07/25    Expected End:  06/06/25               Pain       Patient will report pain of 4/10 demonstrating a reduction of overall pain       Start:  05/07/25    Expected End:  07/04/25               Range of  Motion       Patient will achieve bilateral cervical rotation ROM 45+ degrees       Start:  05/07/25    Expected End:  07/04/25            Patient will achieve bilateral shoulder flexion of 150 degrees       Start:  05/07/25    Expected End:  06/06/25               Strength       Patient will achieve bilateral shoulder flexion strength of 5/5       Start:  05/07/25    Expected End:  07/04/25            Patient will achieve bilateral shoulder abduction strength of 5/5       Start:  05/07/25    Expected End:  07/04/25              Claudia Linn PT, DPT

## 2025-06-20 ENCOUNTER — CLINICAL SUPPORT (OUTPATIENT)
Dept: REHABILITATION | Facility: HOSPITAL | Age: 60
End: 2025-06-20
Payer: MEDICAID

## 2025-06-20 DIAGNOSIS — M25.619 DECREASED RANGE OF MOTION OF SHOULDER, UNSPECIFIED LATERALITY: ICD-10-CM

## 2025-06-20 DIAGNOSIS — R29.898 DECREASED STRENGTH OF UPPER EXTREMITY: ICD-10-CM

## 2025-06-20 DIAGNOSIS — R29.898 DECREASED ROM OF NECK: Primary | ICD-10-CM

## 2025-06-20 PROCEDURE — 97110 THERAPEUTIC EXERCISES: CPT | Mod: PN

## 2025-06-20 NOTE — PROGRESS NOTES
Outpatient Rehab  Physical Therapy Visit    Patient Name: Jerson Isabel  MRN: [id number]  YOB: 1965  Encounter Date: 6/20/2025    Therapy Diagnosis:   Encounter Diagnoses   Name Primary?    Decreased ROM of neck Yes    Decreased range of motion of shoulder, unspecified laterality     Decreased strength of upper extremity      Physician: Brit Fernandez NP    Physician Orders: Eval and Treat  Medical Diagnosis: Spinal stenosis, cervical region  S/P cervical spinal fusion  Shoulder pain, unspecified chronicity, unspecified laterality  Surgical Diagnosis: s/p ACD C4-7 with Dr. Long on 3/12/25.   Surgical Date: 3/12/2025  Days Since Last Surgery: 100  Visit # / Visits Authorized:  8 / 12  Insurance Authorization Period: 5/6/2025 to 9/12/2025  Date of Evaluation: 5/6/2025  Plan of Care Certification: 5/7/2025 to 7/4/2025      PT/PTA: PT   Number of PTA visits since last PT visit:0  Time In: 0915   Time Out: 1010  Total Time (in minutes): 55   Total Billable Time (in minutes): 45    FOTO:  Intake Score: 40%  Survey Score 2:  %  Survey Score 3:  %    Precautions:     ACDF precautions      Subjective   Patient doing okay this morning but still having some pain/discomfort..  Pain reported as 6/10. neck and shoulders    Objective            Treatment:  Therapeutic Exercise  TE 1: UBE x3 min each (foward and backward) Level 5  TE 2: Supine Cervical Rotation on towel x2 min  TE 3: Supine Chin Tucks into towel x30 reps  TE 4: Supine Bicep Curls 3x10 reps + #10 dowel  TE 5: Standing Shoulder Rows 3x10 reps + #20 CC Machine  TE 6: Straight Arm Lat Pull Down 3x10 reps + #17 CC Machine  TE 7: Seated Gripper x2 min each (#50)  TE 8: Seated Cervical Retractions x30 reps  TE 9: Seated Scap Retractions x30 reps  TE 10: Standing Cervical Isometrics - 4 way x30 reps each  Modalities  Cryotherapy (Minutes\Location): x10 minutes to the neck and shoulder  Other Activities  Activity 1: TE: Bilateral ER 2x10 reps +  YTB  Activity 2: TE: Horizontal Shoulder Abduction 2x10 reps + RTB    *A portion of this treatment session is provided with the assistance of a skilled rehbailitation technician under the supervision of a licensed physical therapist.    Time Entry(in minutes):  Hot/Cold Pack Time Entry: 10 (cold pack)  Therapeutic Exercise Time Entry: 45    Assessment & Plan   Assessment: Patient continues to have some discomfort with looking down and when swallowing but feels like things are getting better overall. Encouraged continued performance of Home Exercise Program daily.  Evaluation/Treatment Tolerance: Patient tolerated treatment well    The patient will continue to benefit from skilled outpatient physical therapy in order to address the deficits listed in the problem list on the initial evaluation, provide patient and family education, and maximize the patients level of independence in the home and community environments.     The patient's spiritual, cultural, and educational needs were considered, and the patient is agreeable to the plan of care and goals.     Education  Education was done with Patient. The patient's learning style includes Demonstration and Listening. The patient Demonstrates understanding and Verbalizes understanding.               Plan: Continue with PT POC.    Goals:   Active       ADLs       patient will be able to dress and bathe independently       Start:  05/07/25    Expected End:  06/06/25               Functional outcome       Patient will show a significant change in NDI patient-reported outcome tool to demonstrate subjective improvement       Start:  05/07/25    Expected End:  07/04/25            Patient will demonstrate independence in home program for support of progression       Start:  05/07/25    Expected End:  06/06/25               Pain       Patient will report pain of 4/10 demonstrating a reduction of overall pain       Start:  05/07/25    Expected End:  07/04/25               Range  of Motion       Patient will achieve bilateral cervical rotation ROM 45+ degrees       Start:  05/07/25    Expected End:  07/04/25            Patient will achieve bilateral shoulder flexion of 150 degrees       Start:  05/07/25    Expected End:  06/06/25               Strength       Patient will achieve bilateral shoulder flexion strength of 5/5       Start:  05/07/25    Expected End:  07/04/25            Patient will achieve bilateral shoulder abduction strength of 5/5       Start:  05/07/25    Expected End:  07/04/25              Claudia Linn PT, DPT

## 2025-06-27 ENCOUNTER — CLINICAL SUPPORT (OUTPATIENT)
Dept: REHABILITATION | Facility: HOSPITAL | Age: 60
End: 2025-06-27
Payer: MEDICAID

## 2025-06-27 DIAGNOSIS — M25.619 DECREASED RANGE OF MOTION OF SHOULDER, UNSPECIFIED LATERALITY: ICD-10-CM

## 2025-06-27 DIAGNOSIS — R29.898 DECREASED ROM OF NECK: Primary | ICD-10-CM

## 2025-06-27 DIAGNOSIS — R29.898 DECREASED STRENGTH OF UPPER EXTREMITY: ICD-10-CM

## 2025-06-27 PROCEDURE — 97110 THERAPEUTIC EXERCISES: CPT | Mod: PN

## 2025-06-27 NOTE — PROGRESS NOTES
Outpatient Rehab  Physical Therapy Visit    Patient Name: Jerson Isabel  MRN: [id number]  YOB: 1965  Encounter Date: 6/27/2025    Therapy Diagnosis:   Encounter Diagnoses   Name Primary?    Decreased ROM of neck Yes    Decreased range of motion of shoulder, unspecified laterality     Decreased strength of upper extremity      Physician: Brit Fernandez NP    Physician Orders: Eval and Treat  Medical Diagnosis: Spinal stenosis, cervical region  S/P cervical spinal fusion  Shoulder pain, unspecified chronicity, unspecified laterality  Surgical Diagnosis: s/p ACD C4-7 with Dr. Long on 3/12/25.   Surgical Date: 3/12/2025  Days Since Last Surgery: 107  Visit # / Visits Authorized:  9 / 12  Insurance Authorization Period: 5/6/2025 to 9/12/2025  Date of Evaluation: 5/6/2025  Plan of Care Certification: 5/7/2025 to 7/4/2025      PT/PTA: PT   Number of PTA visits since last PT visit:0  Time In: 0941   Time Out: 1031  Total Time (in minutes): 50   Total Billable Time (in minutes): 45    FOTO:  Intake Score: 40%  Survey Score 2:  %  Survey Score 3:  %    Precautions:     ACDF precautions      Subjective   Patient feeling well overall, not having any pain this morning. Woke up a little stiff but after he gets moving the discomfort goes away..  Pain reported as 0/10. neck and shoulders    Objective            Treatment:  Therapeutic Exercise  TE 1: UBE x3 min each (foward and backward) Level 5  TE 2: Supine Cervical Rotation on towel x2 min  TE 3: Supine Chin Tucks into towel x30 reps  TE 4: Supine Bicep Curls 3x10 reps + #10 dowel  TE 5: Standing Shoulder Rows 3x10 reps + #20 CC Machine  TE 6: Straight Arm Lat Pull Down 3x10 reps + #17 CC Machine  TE 7: Seated Gripper x2 min each (#50)  TE 8: Seated Cervical Retractions x30 reps  TE 9: Seated Scap Retractions x30 reps  TE 10: Standing Cervical Isometrics - 4 way x30 reps each  Modalities  Cryotherapy (Minutes\Location): x5 minutes to the neck and  shoulder  Other Activities  Activity 1: TE: Bilateral ER 2x10 reps + YTB  Activity 2: TE: Horizontal Shoulder Abduction 2x10 reps + RTB  Activity 3: TE: Seated Shoulder Diagonal Pulls x10 reps each side + RTB    *A portion of this treatment session is provided with the assistance of a skilled rehbailitation technician under the supervision of a licensed physical therapist.    Time Entry(in minutes):  Hot/Cold Pack Time Entry: 5 (cold pack)  Therapeutic Exercise Time Entry: 45    Assessment & Plan   Assessment: Patient with good tolerance to seated diagonal pulls this date with emphasis on posture and periscapular strengthening. No exacerbation of symptoms reported during treatment session.  Evaluation/Treatment Tolerance: Patient tolerated treatment well    The patient will continue to benefit from skilled outpatient physical therapy in order to address the deficits listed in the problem list on the initial evaluation, provide patient and family education, and maximize the patients level of independence in the home and community environments.     The patient's spiritual, cultural, and educational needs were considered, and the patient is agreeable to the plan of care and goals.     Education  Education was done with Patient. The patient's learning style includes Demonstration and Listening. The patient Demonstrates understanding and Verbalizes understanding.               Plan: Continue with PT POC.    Goals:   Active       ADLs       patient will be able to dress and bathe independently       Start:  05/07/25    Expected End:  06/06/25               Functional outcome       Patient will show a significant change in NDI patient-reported outcome tool to demonstrate subjective improvement       Start:  05/07/25    Expected End:  07/04/25            Patient will demonstrate independence in home program for support of progression       Start:  05/07/25    Expected End:  06/06/25               Pain       Patient will  report pain of 4/10 demonstrating a reduction of overall pain       Start:  05/07/25    Expected End:  07/04/25               Range of Motion       Patient will achieve bilateral cervical rotation ROM 45+ degrees       Start:  05/07/25    Expected End:  07/04/25            Patient will achieve bilateral shoulder flexion of 150 degrees       Start:  05/07/25    Expected End:  06/06/25               Strength       Patient will achieve bilateral shoulder flexion strength of 5/5       Start:  05/07/25    Expected End:  07/04/25            Patient will achieve bilateral shoulder abduction strength of 5/5       Start:  05/07/25    Expected End:  07/04/25              Claudia Linn PT, DPT

## 2025-07-08 ENCOUNTER — CLINICAL SUPPORT (OUTPATIENT)
Dept: REHABILITATION | Facility: HOSPITAL | Age: 60
End: 2025-07-08
Payer: MEDICAID

## 2025-07-08 DIAGNOSIS — M25.619 DECREASED RANGE OF MOTION OF SHOULDER, UNSPECIFIED LATERALITY: ICD-10-CM

## 2025-07-08 DIAGNOSIS — R29.898 DECREASED ROM OF NECK: Primary | ICD-10-CM

## 2025-07-08 DIAGNOSIS — R29.898 DECREASED STRENGTH OF UPPER EXTREMITY: ICD-10-CM

## 2025-07-08 PROCEDURE — 97110 THERAPEUTIC EXERCISES: CPT | Mod: PN | Performed by: PHYSICAL THERAPIST

## 2025-07-08 NOTE — PROGRESS NOTES
Outpatient Rehab    Physical Therapy Progress Note : Updated Plan of Care    Patient Name: Jerson Isabel  MRN: [id number]  YOB: 1965  Encounter Date: 7/8/2025    Therapy Diagnosis:   Encounter Diagnoses   Name Primary?    Decreased ROM of neck Yes    Decreased range of motion of shoulder, unspecified laterality     Decreased strength of upper extremity      Physician: Brit Fernandez NP    Physician Orders: Eval and Treat  Medical Diagnosis: Spinal stenosis, cervical region  S/P cervical spinal fusion  Shoulder pain, unspecified chronicity, unspecified laterality  Surgical Diagnosis: s/p ACD C4-7 with Dr. Long on 3/12/25.   Surgical Date: 3/12/2025  Days Since Last Surgery: 119    Visit # / Visits Authorized:  10 / 12  Insurance Authorization Period: 5/6/2025 to 9/12/2025  Date of Evaluation: 5/6/2025   Plan of Care Certification: 7/8/2025 to 9/2/2025      PT/PTA:     Number of PTA visits since last PT visit:   Time In: 1100   Time Out: 1155  Total Time (in minutes): 55   Total Billable Time (in minutes): 55    FOTO:  Intake Score:  %  Survey Score 2:  %  Survey Score 3:  %    Precautions:       Subjective   he has good days and bad days. Today he is having some pain globally. He notes a feeling of his throat closing when he nods his head forward. He is interested in continuing PT if insuance approves..  Pain reported as 6/10. global    Objective   Bracing    No collar        Posture  Patient presents with a Forward head position. Increased thoracic kyphosis is observed.                 Right Upper Extremity Reflexes       Biceps, C5-C6: Normal (2+)    Brachioradialis, C6: Normal (2+)    Triceps, C7: Normal (2+)    Andrade's Sign: No    Left Upper Extremity Reflexes       Biceps, C5-C6: Normal (2+)    Brachioradialis, C6: Normal (2+)    Triceps, C7: Normal (2+)    Andrade's Sign: No        Subcranial Range of Motion   Active Restricted? Passive Restricted? Pain   Flexion         Protraction          Retraction           Cervical Range of Motion   Active (deg) Passive (deg) Pain   Flexion 40       Extension 50       Right Lateral Flexion 50       Right Rotation 60       Left Lateral Flexion 35       Left Rotation 55                      Treatment:  Therapeutic Exercise  TE 1: UBE x3 min each (foward and backward) Level 5  TE 4: Standing Bicep Curls 3x10 reps + #10 db  TE 5: Standing Shoulder Rows 3x10 reps + #20 CC Machine  TE 6: Straight Arm Lat Pull Down 3x10 reps + #17 CC Machine  TE 7: Seated Gripper x2 min each (#50)  TE 8: Seated Cervical Retractions x30 reps  TE 9: Seated Scap Retractions x30 reps  TE 10: Standing Cervical Isometrics - 4 way x30 reps each  Balance/Neuromuscular Re-Education  NMR 1: Ganesh ER x20 reps RTB  NMR 2: Horizontal abd x20 reps RTB  NMR 3: TB Diagonals x20 reps RTB  NMR 4: Reassessment      Time Entry(in minutes):  Therapeutic Exercise Time Entry: 55    Assessment & Plan   Assessment  Upon assessment, Jerson demonstrates increased pain free cervical ROM, increased BUE strength, improved postural awareness, and reduced TTP to neck and shoulder region. Pt has progressed well towards PT goals but would benefit from updated POC and continuance to address remaining functional limitations.  Evaluation/Treatment Tolerance: Patient tolerated treatment well    The patient will continue to benefit from skilled outpatient physical therapy in order to address the deficits listed in the problem list on the initial evaluation, provide patient and family education, and maximize the patients level of independence in the home and community environments.     The patient's spiritual, cultural, and educational needs were considered, and the patient is agreeable to the plan of care and goals.           Goals:   Active       ADLs       patient will be able to dress and bathe independently (Progressing)       Start:  05/07/25    Expected End:  06/06/25               Functional outcome       Patient will  show a significant change in NDI patient-reported outcome tool to demonstrate subjective improvement (Progressing)       Start:  05/07/25    Expected End:  07/04/25            Patient will demonstrate independence in home program for support of progression (Progressing)       Start:  05/07/25    Expected End:  06/06/25               Strength       Patient will achieve bilateral shoulder flexion strength of 5/5 (Met)       Start:  05/07/25    Expected End:  07/04/25    Resolved:  07/08/25         Patient will achieve bilateral shoulder abduction strength of 5/5 (Met)       Start:  05/07/25    Expected End:  07/04/25    Resolved:  07/08/25           Resolved       Pain       Patient will report pain of 4/10 demonstrating a reduction of overall pain (Met)       Start:  05/07/25    Expected End:  07/04/25    Resolved:  07/08/25            Range of Motion       Patient will achieve bilateral cervical rotation ROM 45+ degrees (Met)       Start:  05/07/25    Expected End:  07/04/25    Resolved:  07/08/25         Patient will achieve bilateral shoulder flexion of 150 degrees (Met)       Start:  05/07/25    Expected End:  06/06/25    Resolved:  07/08/25             Alexander Odom, PT, DPT

## 2025-07-11 ENCOUNTER — CLINICAL SUPPORT (OUTPATIENT)
Dept: REHABILITATION | Facility: HOSPITAL | Age: 60
End: 2025-07-11
Payer: MEDICAID

## 2025-07-11 DIAGNOSIS — M25.619 DECREASED RANGE OF MOTION OF SHOULDER, UNSPECIFIED LATERALITY: ICD-10-CM

## 2025-07-11 DIAGNOSIS — R29.898 DECREASED ROM OF NECK: Primary | ICD-10-CM

## 2025-07-11 DIAGNOSIS — R29.898 DECREASED STRENGTH OF UPPER EXTREMITY: ICD-10-CM

## 2025-07-11 PROCEDURE — 97110 THERAPEUTIC EXERCISES: CPT | Mod: PN

## 2025-07-11 NOTE — PROGRESS NOTES
Outpatient Rehab  Physical Therapy Visit    Patient Name: Jerson Isabel  MRN: [id number]  YOB: 1965  Encounter Date: 7/11/2025    Therapy Diagnosis:   Encounter Diagnoses   Name Primary?    Decreased ROM of neck Yes    Decreased range of motion of shoulder, unspecified laterality     Decreased strength of upper extremity      Physician: Brit Fernandez NP    Physician Orders: Eval and Treat  Medical Diagnosis: Spinal stenosis, cervical region  S/P cervical spinal fusion  Shoulder pain, unspecified chronicity, unspecified laterality  Surgical Diagnosis: s/p ACD C4-7 with Dr. Long on 3/12/25.   Surgical Date: 3/12/2025  Days Since Last Surgery: 121  Visit # / Visits Authorized:  11 / 16  Insurance Authorization Period: 5/6/2025 to 9/12/2025  Date of Evaluation: 5/6/2025  Plan of Care Certification: 7/8/2025 to 9/2/2025      PT/PTA: PT   Number of PTA visits since last PT visit:0  Time In: 0845 (patient arrived late)   Time Out: 0925  Total Time (in minutes): 40   Total Billable Time (in minutes): 40    FOTO:  Intake Score: 40%  Survey Score 2:  %  Survey Score 3:  %    Precautions:     ACDF precautions      Subjective   Patient doing well overall. Feels like therapy is helping but still having some pain this morning. He would like to continue with therapy if insurance covers more..  Pain reported as 4/10. Neck and Shoulders    Objective            Treatment:  Therapeutic Exercise  TE 1: UBE x3 min each (foward and backward) Level 5  TE 2: SB Walk Ups x10 reps with gentle cervical extension  TE 3: Bent Over Shoulder Row 2x10 reps B + #10 DB  TE 4: Standing Bicep Curls 3x10 reps + #10 DB  TE 5: Standing Shoulder Rows 2x10 reps + #23 CC Machine  TE 6: Straight Arm Lat Pull Down 2x10 reps + #20 CC Machine  TE 7: Seated Gripper x2 min each (#50)  TE 8: Seated Cervical Retractions x30 reps  TE 9: Seated Scap Retractions x30 reps  TE 10: Standing Cervical Isometrics - 4 way x30 reps  each  Balance/Neuromuscular Re-Education  NMR 1: Ganesh ER x20 reps RTB  NMR 2: Horizontal abd x20 reps RTB  NMR 3: TB Diagonals x20 reps RTB    *A portion of this treatment session is provided with the assistance of a skilled rehbailitation technician under the supervision of a licensed physical therapist.    Time Entry(in minutes):  Therapeutic Exercise Time Entry: 40    Assessment & Plan   Assessment: Patient progressed well overall through activities this date. Able to progress reps and or intensity of some exercises without complication. Encouraged continued performance of Home Exercise Program daily.  Evaluation/Treatment Tolerance: Patient tolerated treatment well    The patient will continue to benefit from skilled outpatient physical therapy in order to address the deficits listed in the problem list on the initial evaluation, provide patient and family education, and maximize the patients level of independence in the home and community environments.     The patient's spiritual, cultural, and educational needs were considered, and the patient is agreeable to the plan of care and goals.     Education  Education was done with Patient. The patient's learning style includes Demonstration and Listening. The patient Demonstrates understanding and Verbalizes understanding.               Plan: Continue with PT POC.    Goals:   Active       ADLs       patient will be able to dress and bathe independently (Progressing)       Start:  05/07/25    Expected End:  06/06/25               Functional outcome       Patient will show a significant change in NDI patient-reported outcome tool to demonstrate subjective improvement (Progressing)       Start:  05/07/25    Expected End:  07/04/25            Patient will demonstrate independence in home program for support of progression (Progressing)       Start:  05/07/25    Expected End:  06/06/25               Strength       Patient will achieve bilateral shoulder flexion strength  of 5/5 (Met)       Start:  05/07/25    Expected End:  07/04/25    Resolved:  07/08/25         Patient will achieve bilateral shoulder abduction strength of 5/5 (Met)       Start:  05/07/25    Expected End:  07/04/25    Resolved:  07/08/25           Resolved       Pain       Patient will report pain of 4/10 demonstrating a reduction of overall pain (Met)       Start:  05/07/25    Expected End:  07/04/25    Resolved:  07/08/25            Range of Motion       Patient will achieve bilateral cervical rotation ROM 45+ degrees (Met)       Start:  05/07/25    Expected End:  07/04/25    Resolved:  07/08/25         Patient will achieve bilateral shoulder flexion of 150 degrees (Met)       Start:  05/07/25    Expected End:  06/06/25    Resolved:  07/08/25           Claudia Linn PT, DPT

## 2025-07-24 ENCOUNTER — CLINICAL SUPPORT (OUTPATIENT)
Dept: REHABILITATION | Facility: HOSPITAL | Age: 60
End: 2025-07-24
Payer: MEDICAID

## 2025-07-24 DIAGNOSIS — R29.898 DECREASED STRENGTH OF UPPER EXTREMITY: ICD-10-CM

## 2025-07-24 DIAGNOSIS — M25.619 DECREASED RANGE OF MOTION OF SHOULDER, UNSPECIFIED LATERALITY: ICD-10-CM

## 2025-07-24 DIAGNOSIS — R29.898 DECREASED ROM OF NECK: Primary | ICD-10-CM

## 2025-07-24 PROCEDURE — 97110 THERAPEUTIC EXERCISES: CPT | Mod: PN

## 2025-07-24 NOTE — PROGRESS NOTES
Outpatient Rehab    Physical Therapy Visit    Patient Name: Jerson Isabel  MRN: [id number]  YOB: 1965  Encounter Date: 7/24/2025    Therapy Diagnosis:   Encounter Diagnoses   Name Primary?    Decreased ROM of neck Yes    Decreased range of motion of shoulder, unspecified laterality     Decreased strength of upper extremity      Physician: Brit Fernandez NP    Physician Orders: Eval and Treat  Medical Diagnosis: Spinal stenosis, cervical region  S/P cervical spinal fusion  Shoulder pain, unspecified chronicity, unspecified laterality  Surgical Diagnosis: s/p ACD C4-7 with Dr. Long on 3/12/25.   Surgical Date: 3/12/2025  Days Since Last Surgery: 134    Visit # / Visits Authorized:  12 / 16  Insurance Authorization Period: 5/6/2025 to 9/12/2025  Date of Evaluation: 5/6/2025  Plan of Care Certification: 7/8/2025 to 9/2/2025      PT/PTA:     Number of PTA visits since last PT visit:   Time In: 1420   Time Out: 1500  Total Time (in minutes): 40   Total Billable Time (in minutes): 40    FOTO:  Intake Score (%): 40  Survey Score 2 (%): Not applicable for this Episode  Survey Score 3 (%): Not applicable for this Episode    Precautions:  Additional Precautions and Protocol Details: ACDF precautions      Subjective   He has been having some discomfort in neck and occasional tingling/numbness in finger tips if he sleeps on his side..  Pain reported as 4/10. Neck and Shoulders    Objective            Treatment:  Therapeutic Exercise  TE 1: UBE x3 min each (foward and backward) Level 5  TE 3: Bent Over Shoulder Row 3x10 reps B + #10 DB  TE 4: Standing Bicep Curls 3x10 reps + #10 DB  TE 5: Standing Shoulder Rows 2x10 reps + #23 CC Machine  TE 6: Straight Arm Lat Pull Down 2x10 reps + #20 CC Machine  TE 7: Seated Gripper x2 min each (#50)  TE 8: Seated Cervical Retractions x30 reps  TE 9: Seated Scap Retractions x30 reps  TE 10: Standing Cervical Isometrics - 4 way x30 reps each  Balance/Neuromuscular  Re-Education  NMR 1: Ganesh ER x20 reps RTB  NMR 2: Horizontal abd x20 reps RTB  NMR 3: TB Diagonals x20 reps RTB    Time Entry(in minutes):  Therapeutic Exercise Time Entry: 40    Assessment & Plan   Assessment: Patient has been compliant with HEP and is able to perform above exercises without increase in symptoms.  He has complaints of tingling in his fingertips if he sleeps on his side, but reports no complaints throughout the day.  Evaluation/Treatment Tolerance: Patient tolerated treatment well    The patient will continue to benefit from skilled outpatient physical therapy in order to address the deficits listed in the problem list on the initial evaluation, provide patient and family education, and maximize the patients level of independence in the home and community environments.     The patient's spiritual, cultural, and educational needs were considered, and the patient is agreeable to the plan of care and goals.           Plan: Continue with PT POC.    Goals:   Active       ADLs       patient will be able to dress and bathe independently (Progressing)       Start:  05/07/25    Expected End:  06/06/25               Functional outcome       Patient will show a significant change in NDI patient-reported outcome tool to demonstrate subjective improvement (Progressing)       Start:  05/07/25    Expected End:  07/04/25            Patient will demonstrate independence in home program for support of progression (Progressing)       Start:  05/07/25    Expected End:  06/06/25               Strength       Patient will achieve bilateral shoulder flexion strength of 5/5 (Met)       Start:  05/07/25    Expected End:  07/04/25    Resolved:  07/08/25         Patient will achieve bilateral shoulder abduction strength of 5/5 (Met)       Start:  05/07/25    Expected End:  07/04/25    Resolved:  07/08/25           Resolved       Pain       Patient will report pain of 4/10 demonstrating a reduction of overall pain (Met)        Start:  05/07/25    Expected End:  07/04/25    Resolved:  07/08/25            Range of Motion       Patient will achieve bilateral cervical rotation ROM 45+ degrees (Met)       Start:  05/07/25    Expected End:  07/04/25    Resolved:  07/08/25         Patient will achieve bilateral shoulder flexion of 150 degrees (Met)       Start:  05/07/25    Expected End:  06/06/25    Resolved:  07/08/25             Jose Shaw PT

## 2025-07-29 ENCOUNTER — CLINICAL SUPPORT (OUTPATIENT)
Dept: REHABILITATION | Facility: HOSPITAL | Age: 60
End: 2025-07-29
Payer: MEDICAID

## 2025-07-29 DIAGNOSIS — M25.619 DECREASED RANGE OF MOTION OF SHOULDER, UNSPECIFIED LATERALITY: ICD-10-CM

## 2025-07-29 DIAGNOSIS — R29.898 DECREASED STRENGTH OF UPPER EXTREMITY: ICD-10-CM

## 2025-07-29 DIAGNOSIS — R29.898 DECREASED ROM OF NECK: Primary | ICD-10-CM

## 2025-07-29 PROCEDURE — 97110 THERAPEUTIC EXERCISES: CPT | Mod: PN

## 2025-07-30 NOTE — PROGRESS NOTES
Outpatient Rehab    Physical Therapy Visit    Patient Name: Jerson Isabel  MRN: [id number]  YOB: 1965  Encounter Date: 7/29/2025    Therapy Diagnosis:   Encounter Diagnoses   Name Primary?    Decreased ROM of neck Yes    Decreased range of motion of shoulder, unspecified laterality     Decreased strength of upper extremity      Physician: Brit Fernandez NP    Physician Orders: Eval and Treat  Medical Diagnosis: Spinal stenosis, cervical region  S/P cervical spinal fusion  Shoulder pain, unspecified chronicity, unspecified laterality  Surgical Diagnosis: s/p ACD C4-7 with Dr. Long on 3/12/25.   Surgical Date: 3/12/2025  Days Since Last Surgery: 140    Visit # / Visits Authorized:  13 / 16  Insurance Authorization Period: 5/6/2025 to 9/12/2025  Date of Evaluation: 5/6/2025  Plan of Care Certification: 7/8/2025 to 9/2/2025      PT/PTA:     Number of PTA visits since last PT visit:   Time In: 1100   Time Out: 1140  Total Time (in minutes): 40   Total Billable Time (in minutes): 40    FOTO:  Intake Score (%): 40  Survey Score 2 (%): Not applicable for this Episode  Survey Score 3 (%): Not applicable for this Episode    Precautions:  Additional Precautions and Protocol Details: ACDF precautions      Subjective   Patient reports having pain shoulders when lying on his side, and having some numbness in his fingertips..  Pain reported as 4/10. Neck and Shoulders    Objective            Treatment:  Therapeutic Exercise  TE 1: UBE x3 min each (foward and backward) Level 5  TE 3: Bent Over Shoulder Row 3x10 reps B + #10 DB  TE 4: Standing Bicep Curls 3x10 reps + #10 DB  TE 5: Standing Shoulder Rows 2x10 reps + #23 CC Machine  TE 6: Straight Arm Lat Pull Down 2x10 reps + #20 CC Machine  TE 7: Seated Gripper x2 min each (#50)  TE 8: Seated Cervical Retractions x30 reps  TE 9: Seated Scap Retractions x30 reps  TE 10: Standing Cervical Isometrics - 4 way x30 reps each  Balance/Neuromuscular Re-Education  NMR 1:  Ganesh ER x20 reps RTB  NMR 2: Horizontal abd x20 reps RTB  NMR 3: TB Diagonals x20 reps RTB    Time Entry(in minutes):  Therapeutic Exercise Time Entry: 40    Assessment & Plan   Assessment: Patient continues with strengthening and scapular stabilization exercises to improve posture and stability of cervical region.  He continues to report pain in his shoulders when he is lying on either side.   Evaluation/Treatment Tolerance: Patient tolerated treatment well    The patient will continue to benefit from skilled outpatient physical therapy in order to address the deficits listed in the problem list on the initial evaluation, provide patient and family education, and maximize the patients level of independence in the home and community environments.     The patient's spiritual, cultural, and educational needs were considered, and the patient is agreeable to the plan of care and goals.           Plan: Continue with PT POC.    Goals:   Active       ADLs       patient will be able to dress and bathe independently (Progressing)       Start:  05/07/25    Expected End:  06/06/25               Functional outcome       Patient will show a significant change in NDI patient-reported outcome tool to demonstrate subjective improvement (Progressing)       Start:  05/07/25    Expected End:  07/04/25            Patient will demonstrate independence in home program for support of progression (Progressing)       Start:  05/07/25    Expected End:  06/06/25               Strength       Patient will achieve bilateral shoulder flexion strength of 5/5 (Met)       Start:  05/07/25    Expected End:  07/04/25    Resolved:  07/08/25         Patient will achieve bilateral shoulder abduction strength of 5/5 (Met)       Start:  05/07/25    Expected End:  07/04/25    Resolved:  07/08/25           Resolved       Pain       Patient will report pain of 4/10 demonstrating a reduction of overall pain (Met)       Start:  05/07/25    Expected End:   07/04/25    Resolved:  07/08/25            Range of Motion       Patient will achieve bilateral cervical rotation ROM 45+ degrees (Met)       Start:  05/07/25    Expected End:  07/04/25    Resolved:  07/08/25         Patient will achieve bilateral shoulder flexion of 150 degrees (Met)       Start:  05/07/25    Expected End:  06/06/25    Resolved:  07/08/25             Jose Shaw PT

## 2025-08-01 ENCOUNTER — CLINICAL SUPPORT (OUTPATIENT)
Dept: REHABILITATION | Facility: HOSPITAL | Age: 60
End: 2025-08-01
Payer: MEDICAID

## 2025-08-01 DIAGNOSIS — R29.898 DECREASED ROM OF NECK: Primary | ICD-10-CM

## 2025-08-01 DIAGNOSIS — M25.619 DECREASED RANGE OF MOTION OF SHOULDER, UNSPECIFIED LATERALITY: ICD-10-CM

## 2025-08-01 DIAGNOSIS — R29.898 DECREASED STRENGTH OF UPPER EXTREMITY: ICD-10-CM

## 2025-08-01 PROCEDURE — 97110 THERAPEUTIC EXERCISES: CPT | Mod: PN

## 2025-08-01 NOTE — PROGRESS NOTES
Outpatient Rehab    Physical Therapy Progress Note    Patient Name: Jerson Isabel  MRN: [id number]  YOB: 1965  Encounter Date: 8/1/2025    Therapy Diagnosis:   Encounter Diagnoses   Name Primary?    Decreased ROM of neck Yes    Decreased range of motion of shoulder, unspecified laterality     Decreased strength of upper extremity      Physician: Brit Fernandez NP    Physician Orders: Eval and Treat  Medical Diagnosis: Spinal stenosis, cervical region  S/P cervical spinal fusion  Shoulder pain, unspecified chronicity, unspecified laterality  Surgical Diagnosis: s/p ACD C4-7 with Dr. Long on 3/12/25.   Surgical Date: 3/12/2025  Days Since Last Surgery: 142    Visit # / Visits Authorized:  14 / 16  Insurance Authorization Period: 5/6/2025 to 9/12/2025  Date of Evaluation: 5/6/2025  Plan of Care Certification: 7/8/2025 to 9/2/2025      PT/PTA:     Number of PTA visits since last PT visit:   Time In: 1000   Time Out: 1055  Total Time (in minutes): 55   Total Billable Time (in minutes): 55    FOTO:  Intake Score (%): 40  Survey Score 2 (%): Not applicable for this Episode  Survey Score 3 (%): Not applicable for this Episode    Precautions:  Additional Precautions and Protocol Details: ACDF precautions    Subjective   Patient reports having increase in neck pain last night while sleeping and continued increased pain this morning.  He is also reporting an increase in frequency of numbness/tingling in tips of his fingers in both hands..  Pain reported as 8/10. Neck and Shoulders    Objective      Shoulder Strength - Planes of Motion   Right Strength Right Pain Left Strength Left  Pain   Flexion 4+   4+     Extension 4+   4+     ABduction 4   4     ADduction           Horizontal ABduction           Horizontal ADduction           Internal Rotation 0° 4+   5     Internal Rotation 90°           External Rotation 0° 4+   4+     External Rotation 90°                        Bracing    No collar            Posture  Patient presents with a Forward head position. Increased thoracic kyphosis is observed.                  Right Upper Extremity Reflexes       Biceps, C5-C6: Normal (2+)    Brachioradialis, C6: Normal (2+)    Triceps, C7: Normal (2+)    Andrade's Sign: No     Left Upper Extremity Reflexes       Biceps, C5-C6: Normal (2+)    Brachioradialis, C6: Normal (2+)    Triceps, C7: Normal (2+)    Andrade's Sign: No           Subcranial Range of Motion    Active Restricted? Passive Restricted? Pain   Flexion         Protraction         Retraction            Cervical Range of Motion    Active (deg) Passive (deg) Pain   Flexion 30       Extension 50       Right Lateral Flexion 25       Right Rotation 50       Left Lateral Flexion 25       Left Rotation 50               Treatment:  Therapeutic Exercise  TE 1: UBE x3 min each (foward and backward) Level 5  TE 3: Bent Over Shoulder Row 3x10 reps B + #10 DB  TE 4: Standing Bicep Curls 3x10 reps + #5 DB  TE 5: Standing Shoulder Rows 2x10 reps + #23 CC Machine  TE 6: Straight Arm Lat Pull Down 2x10 reps + #20 CC Machine  TE 7: Seated Gripper x2 min each (#50)  TE 8: Seated Cervical Retractions x30 reps  TE 9: Seated Scap Retractions x30 reps  TE 10: Standing Cervical Isometrics - 4 way x30 reps each  Manual Therapy  MT 1: IASTM to bilateral upper trapezius, levator scapulae, and posterior cervical musculature  Balance/Neuromuscular Re-Education  NMR 1: Ganesh ER x30 reps RTB  NMR 2: Horizontal abd x30 reps RTB  NMR 3: TB Diagonals x30 reps RTB    Time Entry(in minutes):  Therapeutic Exercise Time Entry: 55    Assessment & Plan   Assessment: Patient is having increased pain and muscle tension on this date causing a decrease in his CROM measurements.  He receives IASTM and reports decreased pain levels following treatment.  He will continue with UE strengthening, scapular stabilization exercises to improve overall strength and stability of cervical region.    Evaluation/Treatment  Tolerance: Patient tolerated treatment well    The patient will continue to benefit from skilled outpatient physical therapy in order to address the deficits listed in the problem list on the initial evaluation, provide patient and family education, and maximize the patients level of independence in the home and community environments.     The patient's spiritual, cultural, and educational needs were considered, and the patient is agreeable to the plan of care and goals.           Plan: Continue with PT POC.    Goals:   Active       ADLs       patient will be able to dress and bathe independently (Progressing)       Start:  05/07/25    Expected End:  06/06/25               Functional outcome       Patient will show a significant change in NDI patient-reported outcome tool to demonstrate subjective improvement (Progressing)       Start:  05/07/25    Expected End:  07/04/25            Patient will demonstrate independence in home program for support of progression (Progressing)       Start:  05/07/25    Expected End:  06/06/25               Strength       Patient will achieve bilateral shoulder flexion strength of 5/5 (Met)       Start:  05/07/25    Expected End:  07/04/25    Resolved:  07/08/25         Patient will achieve bilateral shoulder abduction strength of 5/5 (Met)       Start:  05/07/25    Expected End:  07/04/25    Resolved:  07/08/25           Resolved       Pain       Patient will report pain of 4/10 demonstrating a reduction of overall pain (Met)       Start:  05/07/25    Expected End:  07/04/25    Resolved:  07/08/25            Range of Motion       Patient will achieve bilateral cervical rotation ROM 45+ degrees (Met)       Start:  05/07/25    Expected End:  07/04/25    Resolved:  07/08/25         Patient will achieve bilateral shoulder flexion of 150 degrees (Met)       Start:  05/07/25    Expected End:  06/06/25    Resolved:  07/08/25             Jose Shaw PT

## (undated) DEVICE — SUT MONOCRYL 4-0 PS-1 UND

## (undated) DEVICE — TUBE FRAZIER 5MM 2FT SOFT TIP

## (undated) DEVICE — BLADE 4IN EDGE INSULATED

## (undated) DEVICE — KIT EVACUATOR 3-SPRING 1/8 DRN

## (undated) DEVICE — DRAPE STERI-DRAPE 1000 17X11IN

## (undated) DEVICE — CORD BIPOLAR 12 FOOT

## (undated) DEVICE — TAPE SILK 3IN

## (undated) DEVICE — KIT SURGIFLO HEMOSTATIC MATRIX

## (undated) DEVICE — PENCIL ROCKER SWITCH 10FT CORD

## (undated) DEVICE — GAUZE SPONGE PEANUT STRL

## (undated) DEVICE — TRAY NEURO OMC

## (undated) DEVICE — MARKER SKIN RULER STERILE

## (undated) DEVICE — BAG PRESSURE INFUSER 1000CC

## (undated) DEVICE — APPLICATOR CHLORAPREP ORN 26ML

## (undated) DEVICE — ADHESIVE DERMABOND ADVANCED

## (undated) DEVICE — TRAY CATH 1-LYR URIMTR 16FR

## (undated) DEVICE — Device

## (undated) DEVICE — DRAPE TOP 53X102IN

## (undated) DEVICE — SPONGE COTTON TRAY 4X4IN

## (undated) DEVICE — DRAPE OPMI STERILE

## (undated) DEVICE — DRESSING SURGICAL 1/2X1/2

## (undated) DEVICE — SUT CTD VICRYL 3-0 CR/SH

## (undated) DEVICE — PACK ECLIPSE SET-UP W/O DRAPE

## (undated) DEVICE — DRAPE STERI INSTRUMENT 1018

## (undated) DEVICE — SYR 10CC LUER LOCK

## (undated) DEVICE — BUR BONE CUT MICRO TPS 3X3.8MM

## (undated) DEVICE — ELECTRODE MEGADYNE RETURN DUAL

## (undated) DEVICE — NDL SPINAL 18GX3.5 SPINOCAN

## (undated) DEVICE — PIN DISTRACTION DISP 14MM
Type: IMPLANTABLE DEVICE | Site: SPINE CERVICAL | Status: NON-FUNCTIONAL
Removed: 2025-03-12

## (undated) DEVICE — DRAPE T THYROID STERILE